# Patient Record
Sex: FEMALE | Race: WHITE | Employment: FULL TIME | URBAN - METROPOLITAN AREA
[De-identification: names, ages, dates, MRNs, and addresses within clinical notes are randomized per-mention and may not be internally consistent; named-entity substitution may affect disease eponyms.]

---

## 2017-06-24 ENCOUNTER — HOSPITAL ENCOUNTER (EMERGENCY)
Facility: HOSPITAL | Age: 37
Discharge: HOME | End: 2017-06-24
Attending: EMERGENCY MEDICINE
Payer: COMMERCIAL

## 2017-06-24 ENCOUNTER — OFFICE VISIT (OUTPATIENT)
Dept: FAMILY MEDICINE CLINIC | Facility: CLINIC | Age: 37
End: 2017-06-24

## 2017-06-24 ENCOUNTER — APPOINTMENT (OUTPATIENT)
Dept: CT IMAGING | Facility: HOSPITAL | Age: 37
End: 2017-06-24
Attending: EMERGENCY MEDICINE
Payer: COMMERCIAL

## 2017-06-24 VITALS
RESPIRATION RATE: 14 BRPM | BODY MASS INDEX: 24.11 KG/M2 | SYSTOLIC BLOOD PRESSURE: 135 MMHG | HEIGHT: 66 IN | OXYGEN SATURATION: 96 % | WEIGHT: 150 LBS | DIASTOLIC BLOOD PRESSURE: 82 MMHG | HEART RATE: 85 BPM | TEMPERATURE: 98 F

## 2017-06-24 VITALS
DIASTOLIC BLOOD PRESSURE: 84 MMHG | HEART RATE: 110 BPM | RESPIRATION RATE: 16 BRPM | OXYGEN SATURATION: 99 % | SYSTOLIC BLOOD PRESSURE: 124 MMHG | TEMPERATURE: 98 F

## 2017-06-24 DIAGNOSIS — J36 PERITONSILLAR ABSCESS: Primary | ICD-10-CM

## 2017-06-24 DIAGNOSIS — Z02.9 ENCOUNTERS FOR ADMINISTRATIVE PURPOSE: Primary | ICD-10-CM

## 2017-06-24 PROCEDURE — 86403 PARTICLE AGGLUT ANTBDY SCRN: CPT | Performed by: EMERGENCY MEDICINE

## 2017-06-24 PROCEDURE — 70490 CT SOFT TISSUE NECK W/O DYE: CPT | Performed by: EMERGENCY MEDICINE

## 2017-06-24 PROCEDURE — 96375 TX/PRO/DX INJ NEW DRUG ADDON: CPT

## 2017-06-24 PROCEDURE — 99284 EMERGENCY DEPT VISIT MOD MDM: CPT

## 2017-06-24 PROCEDURE — 96361 HYDRATE IV INFUSION ADD-ON: CPT

## 2017-06-24 PROCEDURE — 81025 URINE PREGNANCY TEST: CPT

## 2017-06-24 PROCEDURE — 96374 THER/PROPH/DIAG INJ IV PUSH: CPT

## 2017-06-24 PROCEDURE — 42700 I&D ABSCESS PERITONSILLAR: CPT

## 2017-06-24 PROCEDURE — 70492 CT SFT TSUE NCK W/O & W/DYE: CPT | Performed by: EMERGENCY MEDICINE

## 2017-06-24 PROCEDURE — 99285 EMERGENCY DEPT VISIT HI MDM: CPT

## 2017-06-24 PROCEDURE — 80048 BASIC METABOLIC PNL TOTAL CA: CPT | Performed by: EMERGENCY MEDICINE

## 2017-06-24 RX ORDER — HYDROCODONE BITARTRATE AND ACETAMINOPHEN 5; 325 MG/1; MG/1
1-2 TABLET ORAL EVERY 4 HOURS PRN
Qty: 20 TABLET | Refills: 0 | Status: SHIPPED | OUTPATIENT
Start: 2017-06-24 | End: 2017-07-01

## 2017-06-24 RX ORDER — HYDROMORPHONE HYDROCHLORIDE 1 MG/ML
1 INJECTION, SOLUTION INTRAMUSCULAR; INTRAVENOUS; SUBCUTANEOUS ONCE
Status: COMPLETED | OUTPATIENT
Start: 2017-06-24 | End: 2017-06-24

## 2017-06-24 RX ORDER — CEFUROXIME AXETIL 500 MG/1
500 TABLET ORAL 2 TIMES DAILY
Qty: 20 TABLET | Refills: 0 | Status: SHIPPED | OUTPATIENT
Start: 2017-06-24 | End: 2018-09-27

## 2017-06-24 RX ORDER — SODIUM CHLORIDE 9 MG/ML
1000 INJECTION, SOLUTION INTRAVENOUS ONCE
Status: COMPLETED | OUTPATIENT
Start: 2017-06-24 | End: 2017-06-24

## 2017-06-24 RX ORDER — KETOROLAC TROMETHAMINE 30 MG/ML
30 INJECTION, SOLUTION INTRAMUSCULAR; INTRAVENOUS ONCE
Status: COMPLETED | OUTPATIENT
Start: 2017-06-24 | End: 2017-06-24

## 2017-06-24 NOTE — ED NOTES
Assisted Dr. Dayna Gunn @ bs for I&D of peritonsillar abscess. Suction to bs. With emesis basin.  Emotional support offered

## 2017-06-24 NOTE — ED NOTES
Pt sitting up on stretcher smiling. Pt stating her throat feels much better with less pain to left side of neck making it easier to open  Her mouth. Ice chips given.

## 2017-06-24 NOTE — ED PROVIDER NOTES
Patient Seen in: [facility] Emergency Department    History   Patient presents with:  Sore Throat    Stated Complaint: rule out tonsillary abscess    HPI    37-year-old woman who comes in with 8 days of sore throat.   She was treated with amoxicillin 98 °F (36.7 °C) (Temporal)   Resp 14   Ht 167.6 cm (5' 6\")   Wt 68 kg   SpO2 96%   BMI 24.21 kg/m²         Physical Exam    General:  Vitals as listed. No acute distress   HEENT: Sclerae anicteric. Conjunctivae show no pallor. Mild trismus.   Oropharynx

## 2017-06-24 NOTE — ED INITIAL ASSESSMENT (HPI)
Patient presents from urgent care for evaluation of left sided sore throat. She tested positive for strep about a week ago and completed amoxicillin without relief, she also started azithromycin outpatient without improvement.

## 2017-06-24 NOTE — CONSULTS
Name: Lillie An  Consulting Physician Kayley Griffiths of note sent to]: Dr. Hansel Luevano  Reason for Request: PTA    Onset Date: 6/24/17    Medications: (reviewed EMR)   Allergies: (reviewed EMR) No Known Allergies    PMH/PSH: (reviwed EMR)  FH/SH: (reviwed EMR)   R Sinuses nontender to palp. Facial strength symmetric/full. SCM symmetric, FROM neck. Ear R: Auricle no lesions; EAC clear/normal; TM nl, nl mobility. Ear L: Auricle no lesions; EAC clear/normal; TM nl, nl mobility.            Nose:  External n

## 2017-06-24 NOTE — PROGRESS NOTES
31 YOWF presents for acute left sided sore throat, leftear pain, left neck lymphnode swelling for about 1 weeks duration. Diagnosed with strep throat while out of town on vacation and started on amoxil without response.   She was changed to zpack which she

## 2018-09-27 PROCEDURE — 87624 HPV HI-RISK TYP POOLED RSLT: CPT | Performed by: OBSTETRICS & GYNECOLOGY

## 2018-09-27 PROCEDURE — 88175 CYTOPATH C/V AUTO FLUID REDO: CPT | Performed by: OBSTETRICS & GYNECOLOGY

## 2020-07-09 DIAGNOSIS — Z78.9 PARTICIPANT IN HEALTH AND WELLNESS PLAN: Primary | ICD-10-CM

## 2020-08-16 ENCOUNTER — NURSE ONLY (OUTPATIENT)
Dept: LAB | Age: 40
End: 2020-08-16
Attending: PREVENTIVE MEDICINE

## 2020-08-16 DIAGNOSIS — Z78.9 PARTICIPANT IN HEALTH AND WELLNESS PLAN: ICD-10-CM

## 2020-08-16 LAB — SARS-COV-2 IGG SERPLBLD QL IA.RAPID: NEGATIVE

## 2020-08-16 PROCEDURE — 86769 SARS-COV-2 COVID-19 ANTIBODY: CPT

## 2020-12-18 ENCOUNTER — IMMUNIZATION (OUTPATIENT)
Dept: LAB | Facility: HOSPITAL | Age: 40
End: 2020-12-18
Attending: PREVENTIVE MEDICINE
Payer: COMMERCIAL

## 2020-12-18 DIAGNOSIS — Z23 NEED FOR VACCINATION: ICD-10-CM

## 2020-12-18 PROCEDURE — 0001A PFIZER-BIONTECH COVID-19 VACCINE: CPT

## 2020-12-28 ENCOUNTER — NURSE ONLY (OUTPATIENT)
Dept: LAB | Facility: HOSPITAL | Age: 40
End: 2020-12-28
Attending: PREVENTIVE MEDICINE

## 2020-12-28 ENCOUNTER — TELEPHONE (OUTPATIENT)
Dept: INTERNAL MEDICINE CLINIC | Facility: HOSPITAL | Age: 40
End: 2020-12-28

## 2020-12-28 DIAGNOSIS — Z20.822 SUSPECTED 2019 NOVEL CORONAVIRUS INFECTION: Primary | ICD-10-CM

## 2020-12-28 DIAGNOSIS — Z20.822 SUSPECTED 2019 NOVEL CORONAVIRUS INFECTION: ICD-10-CM

## 2020-12-28 PROCEDURE — 87426 SARSCOV CORONAVIRUS AG IA: CPT

## 2020-12-28 NOTE — TELEPHONE ENCOUNTER
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 Shares office with educator  Do you have any family members sick at home? [] Yes    [x] No   If yes, explain:       NOTES: Raza Bose called with the above s/s that have worsened over night.            PLAN:   [x]No K

## 2020-12-28 NOTE — TELEPHONE ENCOUNTER
Results and RTW guidelines:    COVID RESULT GIVEN:      Test type:    [x] Rapid         []       [x] NEGATIVE     Ordered  retest?  []Yes   [x] No (skip to RTW)         Notes:  Reports mild sinus congestion improved with cold medicine    RTW PL

## 2021-01-08 ENCOUNTER — IMMUNIZATION (OUTPATIENT)
Dept: LAB | Facility: HOSPITAL | Age: 41
End: 2021-01-08
Attending: PREVENTIVE MEDICINE
Payer: COMMERCIAL

## 2021-01-08 DIAGNOSIS — Z23 NEED FOR VACCINATION: ICD-10-CM

## 2021-01-08 PROCEDURE — 0002A SARSCOV2 VAC 30MCG/0.3ML IM: CPT

## 2021-02-23 ENCOUNTER — OFFICE VISIT (OUTPATIENT)
Dept: SURGERY | Facility: CLINIC | Age: 41
End: 2021-02-23

## 2021-02-23 DIAGNOSIS — L98.8 RHYTIDES: Primary | ICD-10-CM

## 2021-02-23 NOTE — PROCEDURES
Patient presents requesting Botox treatments to her forehead and glabellar region. Her last Botox treatment was in November and without any complications. Procedure: The forehead and glabella region were cleansed with alcohol.   20 units of Botox (lot C6

## 2022-01-07 ENCOUNTER — TELEPHONE (OUTPATIENT)
Dept: INTERNAL MEDICINE CLINIC | Facility: HOSPITAL | Age: 42
End: 2022-01-07

## 2022-01-07 NOTE — TELEPHONE ENCOUNTER
Outside Covid Testing done    Results and RTW guidelines:    COVID RESULT reported:      Test type:    [] Rapid outside         [x] PCR outside    Date of test: 1/5/22     Test location: [facility]         [x] Result viewed in Epic with verbal conse or diarrhea   - Keep communication open with management about RTW and if symptoms worsen     Notes:     RTW PLAN:    []  If COVID positive results, off work minimum of 5 days from positive test or onset of symptoms (day 0)    [x]      On day 5, if asymptom [x]     Elidia Laboy []    J&J []          Date of symptom onset: 1/2/22    SYMPTOMS:  [] asymptomatic    • Fever > 100F               Yes []          • Cough                          Yes []      • Shortness of breath  Yes []      • Congestion                 Y

## 2022-05-09 ENCOUNTER — OFFICE VISIT (OUTPATIENT)
Dept: INTERNAL MEDICINE CLINIC | Facility: CLINIC | Age: 42
End: 2022-05-09
Payer: COMMERCIAL

## 2022-05-09 VITALS
RESPIRATION RATE: 18 BRPM | WEIGHT: 191.81 LBS | BODY MASS INDEX: 31.96 KG/M2 | SYSTOLIC BLOOD PRESSURE: 128 MMHG | DIASTOLIC BLOOD PRESSURE: 78 MMHG | HEART RATE: 90 BPM | HEIGHT: 65 IN

## 2022-05-09 DIAGNOSIS — Z51.81 THERAPEUTIC DRUG MONITORING: Primary | ICD-10-CM

## 2022-05-09 DIAGNOSIS — E66.9 OBESITY WITH BODY MASS INDEX (BMI) OF 30.0 TO 39.9: ICD-10-CM

## 2022-05-09 DIAGNOSIS — R63.2 BINGE EATING: ICD-10-CM

## 2022-05-09 PROCEDURE — 3074F SYST BP LT 130 MM HG: CPT | Performed by: NURSE PRACTITIONER

## 2022-05-09 PROCEDURE — 3078F DIAST BP <80 MM HG: CPT | Performed by: NURSE PRACTITIONER

## 2022-05-09 PROCEDURE — 99204 OFFICE O/P NEW MOD 45 MIN: CPT | Performed by: NURSE PRACTITIONER

## 2022-05-09 PROCEDURE — 3008F BODY MASS INDEX DOCD: CPT | Performed by: NURSE PRACTITIONER

## 2022-05-09 NOTE — PATIENT INSTRUCTIONS
We are here to support you with weight loss, but please remember that you still need your primary care provider for your routine health maintenance. PLAN:  Will start vyvanse 20mg daily   Follow up with me in 4 weeks  Schedule follow up appointments: Aaliyah Nicole (dietitian) or Doren Riedel (presurgery dietitian)   Check for insurance coverage for dietitian and labwork prior to scheduling appointment. Please try to work on the following dietary changes:  1. Goals: Aim for 20-30 grams of protein/ meal  i. Aim for 100 grams of carbohydrates/day  ii. Eat 4-6 vegetables/day  iii. Avoid skipping meals- eat every 4-5 hours  iv. Aim for 3 meals/day  2. Drink lots of water and cut down on soda/juice consumption if soda/juice drinker  3. Focus on protein: (15-30 grams with each meal) ie. greek yogurt, cottage cheese, string cheese, hard boiled eggs  4. Healthy snacks: peanut butter and apples, hummus and carrots, berries, nuts (1/4 cup), tuna and crackers                 Protein Shakes: Premier protein or Core Power                Protein Bars: Rx Bars, Oatmega, Power Crunch                 Sargento balanced breaks (cheese and nuts)- without chocolate  5. Reduce carbohydrates which includes sweets as well as rice, pasta, potatoes, bread, corn and instead choose whole grain options or more protein or vegetables (4-6 servings of vegetables per day)  6. Get a good night of sleep  7. Try to decrease stress in life     Please download apps:  1. \"My Fitness Pal\" (other option is Lose it)) to help you to monitor daily dietary intake and you will be able to see if you are eating the right amount of calories, protein, carbs                With My Fitness Pal-->When you set-up the marianne or need to adjust settings:                Goals should include:                  Lose 1.5-2 lbs per week                Activity level: not very active (can't count exercise towards calorie number per day)                   ** Daily INPUT> Look at nutrition section-- \"nutrients\" and it will break down your macros for the day (ie. Protein, carbs, fibers, sugars and fats). Try to stay within these numbers daily     2. \"7 minute workout\" to help with exercise/activity which takes 7 minutes of your day and that you can do at home! 3. \"Calm\" or \"Headspace\" which helps with mindfulness, meditation, clarity, sleep, and dameon to your daily life. 4. Apalya blog for healthy recipe ideas  5. Clinician [employer]rapeutics for low carb resources    HIGH PROTEIN SNACK IDEAS  -cottage cheese  -plain yogurt  -kefir  -hard-boiled eggs  -natural cheeses  -nuts (measure portion size)   -unsweetened nut butters  -dried edamame   -tracey seeds soaked in water or almond milk  -soy nuts  -cured meats (monitor for sodium issues)   -hummus with vegetables  -bean dip with vegetables     FRUIT  Low carb fruit options   Raspberries: Half a cup (60 grams) contains 3 grams of carbs. Blackberries: Half a cup (70 grams) contains 4 grams of carbs. Strawberries: Half a cup (100 grams) contains 6 grams of carbs. Blueberries: Half a cup (50 grams) contains 6 grams of carbs. Plum: One medium-sized (80 grams) contains 6 grams of carbs.      VEGETABLES  Low carb vegetables

## 2022-05-12 NOTE — PROGRESS NOTES
Mildly low Vitamin B12: please start daily over the counter B complex vitamin   Cholesterol: (elevated total, high triglycerides and LDL) recommend exercise, weight loss, increasing fiber, fish and nuts.  You can try adding an over the counter fish oil daily too (to see if this helps)

## 2022-05-13 LAB
ABSOLUTE BASOPHILS: 71 CELLS/UL (ref 0–200)
ABSOLUTE EOSINOPHILS: 122 CELLS/UL (ref 15–500)
ABSOLUTE LYMPHOCYTES: 3121 CELLS/UL (ref 850–3900)
ABSOLUTE MONOCYTES: 612 CELLS/UL (ref 200–950)
ABSOLUTE NEUTROPHILS: 6273 CELLS/UL (ref 1500–7800)
ALBUMIN/GLOBULIN RATIO: 1.4 (CALC) (ref 1–2.5)
ALBUMIN: 4.3 G/DL (ref 3.6–5.1)
ALKALINE PHOSPHATASE: 73 U/L (ref 31–125)
ALT: 20 U/L (ref 6–29)
AST: 18 U/L (ref 10–30)
BASOPHILS: 0.7 %
BILIRUBIN, TOTAL: 0.4 MG/DL (ref 0.2–1.2)
BUN: 7 MG/DL (ref 7–25)
CALCIUM: 9.2 MG/DL (ref 8.6–10.2)
CARBON DIOXIDE: 24 MMOL/L (ref 20–32)
CHLORIDE: 102 MMOL/L (ref 98–110)
CHOL/HDLC RATIO: 4.2 (CALC)
CHOLESTEROL, TOTAL: 223 MG/DL
CREATININE: 0.66 MG/DL (ref 0.5–1.1)
EGFR IF AFRICN AM: 129 ML/MIN/1.73M2
EGFR IF NONAFRICN AM: 111 ML/MIN/1.73M2
EOSINOPHILS: 1.2 %
GLOBULIN: 3.1 G/DL (CALC) (ref 1.9–3.7)
GLUCOSE: 87 MG/DL (ref 65–99)
HDL CHOLESTEROL: 53 MG/DL
HEMATOCRIT: 37.8 % (ref 35–45)
HEMOGLOBIN A1C: 5.6 % OF TOTAL HGB
HEMOGLOBIN: 12.7 G/DL (ref 11.7–15.5)
LDL-CHOLESTEROL: 140 MG/DL (CALC)
LYMPHOCYTES: 30.6 %
MCH: 30.6 PG (ref 27–33)
MCHC: 33.6 G/DL (ref 32–36)
MCV: 91.1 FL (ref 80–100)
MONOCYTES: 6 %
MPV: 11.4 FL (ref 7.5–12.5)
NEUTROPHILS: 61.5 %
NON-HDL CHOLESTEROL: 170 MG/DL (CALC)
PLATELET COUNT: 306 THOUSAND/UL (ref 140–400)
POTASSIUM: 4.1 MMOL/L (ref 3.5–5.3)
PROTEIN, TOTAL: 7.4 G/DL (ref 6.1–8.1)
RDW: 11.8 % (ref 11–15)
RED BLOOD CELL COUNT: 4.15 MILLION/UL (ref 3.8–5.1)
SODIUM: 137 MMOL/L (ref 135–146)
T4, FREE: 1.3 NG/DL (ref 0.8–1.8)
TRIGLYCERIDES: 166 MG/DL
TSH: 1.22 MIU/L
VITAMIN B12: 306 PG/ML (ref 200–1100)
VITAMIN D, 25-OH, TOTAL: 15 NG/ML (ref 30–100)
WHITE BLOOD CELL COUNT: 10.2 THOUSAND/UL (ref 3.8–10.8)

## 2022-05-16 NOTE — PROGRESS NOTES
Here are the added test results  A1c 5.6% is normal (but very close to prediabetes- which is 5.7-6.4%)  Low Vitamin d:  please start taking ergocalciferol 50,000 U q week 16 weeks (please order #16 no refill).  Then start daily maintenance vitamin D 2000 Units  Thyroid is normal

## 2022-06-14 ENCOUNTER — OFFICE VISIT (OUTPATIENT)
Dept: INTERNAL MEDICINE CLINIC | Facility: CLINIC | Age: 42
End: 2022-06-14
Payer: COMMERCIAL

## 2022-06-14 VITALS
HEART RATE: 97 BPM | RESPIRATION RATE: 16 BRPM | HEIGHT: 65 IN | SYSTOLIC BLOOD PRESSURE: 130 MMHG | DIASTOLIC BLOOD PRESSURE: 84 MMHG | BODY MASS INDEX: 30.16 KG/M2 | OXYGEN SATURATION: 99 % | WEIGHT: 181 LBS

## 2022-06-14 DIAGNOSIS — R63.2 BINGE EATING: ICD-10-CM

## 2022-06-14 DIAGNOSIS — Z51.81 THERAPEUTIC DRUG MONITORING: Primary | ICD-10-CM

## 2022-06-14 DIAGNOSIS — E55.9 VITAMIN D DEFICIENCY: ICD-10-CM

## 2022-06-14 DIAGNOSIS — E66.9 OBESITY WITH BODY MASS INDEX (BMI) OF 30.0 TO 39.9: ICD-10-CM

## 2022-06-14 PROCEDURE — 3008F BODY MASS INDEX DOCD: CPT | Performed by: NURSE PRACTITIONER

## 2022-06-14 PROCEDURE — 3079F DIAST BP 80-89 MM HG: CPT | Performed by: NURSE PRACTITIONER

## 2022-06-14 PROCEDURE — 99213 OFFICE O/P EST LOW 20 MIN: CPT | Performed by: NURSE PRACTITIONER

## 2022-06-14 PROCEDURE — 3075F SYST BP GE 130 - 139MM HG: CPT | Performed by: NURSE PRACTITIONER

## 2022-06-14 NOTE — PATIENT INSTRUCTIONS
We are here to support you with weight loss, but please remember that you still need your primary care provider for your routine health maintenance. PLAN:  Will increase vyvanse 30 mg daily   Follow up with me in 4 weeks  Schedule follow up appointments: Anatoliy Rowe (dietitian) or Cinda Smalls (presurgery dietitian)   Check for insurance coverage for dietitian and labwork prior to scheduling appointment. Please try to work on the following dietary changes:  1. Goals: Aim for 20-30 grams of protein/ meal  i. Aim for 100 grams of carbohydrates/day  ii. Eat 4-6 vegetables/day  iii. Avoid skipping meals- eat every 4-5 hours  iv. Aim for 3 meals/day  2. Drink lots of water and cut down on soda/juice consumption if soda/juice drinker  3. Focus on protein: (15-30 grams with each meal) ie. greek yogurt, cottage cheese, string cheese, hard boiled eggs  4. Healthy snacks: peanut butter and apples, hummus and carrots, berries, nuts (1/4 cup), tuna and crackers                 Protein Shakes: Premier protein or Core Power                Protein Bars: Rx Bars, Oatmega, Power Crunch                 Sargento balanced breaks (cheese and nuts)- without chocolate  5. Reduce carbohydrates which includes sweets as well as rice, pasta, potatoes, bread, corn and instead choose whole grain options or more protein or vegetables (4-6 servings of vegetables per day)  6. Get a good night of sleep  7. Try to decrease stress in life     Please download apps:  1. \"My Fitness Pal\" (other option is Lose it)) to help you to monitor daily dietary intake and you will be able to see if you are eating the right amount of calories, protein, carbs                With My Fitness Pal-->When you set-up the marianne or need to adjust settings:                Goals should include:                  Lose 1.5-2 lbs per week                Activity level: not very active (can't count exercise towards calorie number per day)                   ** Daily INPUT> Look at nutrition section-- \"nutrients\" and it will break down your macros for the day (ie. Protein, carbs, fibers, sugars and fats). Try to stay within these numbers daily     2. \"7 minute workout\" to help with exercise/activity which takes 7 minutes of your day and that you can do at home! 3. \"Calm\" or \"Headspace\" which helps with mindfulness, meditation, clarity, sleep, and dameon to your daily life. 4. Identifiede blog for healthy recipe ideas  5. [employer] for low carb resources    HIGH PROTEIN SNACK IDEAS  -cottage cheese  -plain yogurt  -kefir  -hard-boiled eggs  -natural cheeses  -nuts (measure portion size)   -unsweetened nut butters  -dried edamame   -tracey seeds soaked in water or almond milk  -soy nuts  -cured meats (monitor for sodium issues)   -hummus with vegetables  -bean dip with vegetables     FRUIT  Low carb fruit options   Raspberries: Half a cup (60 grams) contains 3 grams of carbs. Blackberries: Half a cup (70 grams) contains 4 grams of carbs. Strawberries: Half a cup (100 grams) contains 6 grams of carbs. Blueberries: Half a cup (50 grams) contains 6 grams of carbs. Plum: One medium-sized (80 grams) contains 6 grams of carbs.      VEGETABLES  Low carb vegetables

## 2022-08-08 ENCOUNTER — OFFICE VISIT (OUTPATIENT)
Dept: INTERNAL MEDICINE CLINIC | Facility: CLINIC | Age: 42
End: 2022-08-08
Payer: COMMERCIAL

## 2022-08-08 VITALS
WEIGHT: 174 LBS | DIASTOLIC BLOOD PRESSURE: 82 MMHG | SYSTOLIC BLOOD PRESSURE: 140 MMHG | RESPIRATION RATE: 16 BRPM | OXYGEN SATURATION: 100 % | BODY MASS INDEX: 28.99 KG/M2 | HEART RATE: 83 BPM | HEIGHT: 65 IN

## 2022-08-08 DIAGNOSIS — Z51.81 THERAPEUTIC DRUG MONITORING: Primary | ICD-10-CM

## 2022-08-08 DIAGNOSIS — R63.2 BINGE EATING: ICD-10-CM

## 2022-08-08 DIAGNOSIS — E66.3 OVERWEIGHT (BMI 25.0-29.9): ICD-10-CM

## 2022-08-08 DIAGNOSIS — E55.9 VITAMIN D DEFICIENCY: ICD-10-CM

## 2022-08-08 PROCEDURE — 3079F DIAST BP 80-89 MM HG: CPT | Performed by: NURSE PRACTITIONER

## 2022-08-08 PROCEDURE — 3008F BODY MASS INDEX DOCD: CPT | Performed by: NURSE PRACTITIONER

## 2022-08-08 PROCEDURE — 3077F SYST BP >= 140 MM HG: CPT | Performed by: NURSE PRACTITIONER

## 2022-08-08 PROCEDURE — 99213 OFFICE O/P EST LOW 20 MIN: CPT | Performed by: NURSE PRACTITIONER

## 2022-08-08 NOTE — PATIENT INSTRUCTIONS
We are here to support you with weight loss, but please remember that you still need your primary care provider for your routine health maintenance. PLAN:  Will increase vyvanse 40 mg daily   Follow up with me in 8-12 weeks  Schedule follow up appointments: Aaliyah Nicole (dietitian) or Doren Riedel (presurgery dietitian)   Check for insurance coverage for dietitian and labwork prior to scheduling appointment. Please try to work on the following dietary changes:  1. Goals: Aim for 20-30 grams of protein/ meal  i. Aim for 100 grams of carbohydrates/day  ii. Eat 4-6 vegetables/day  iii. Avoid skipping meals- eat every 4-5 hours  iv. Aim for 3 meals/day  2. Drink lots of water and cut down on soda/juice consumption if soda/juice drinker  3. Focus on protein: (15-30 grams with each meal) ie. greek yogurt, cottage cheese, string cheese, hard boiled eggs  4. Healthy snacks: peanut butter and apples, hummus and carrots, berries, nuts (1/4 cup), tuna and crackers                 Protein Shakes: Premier protein or Core Power                Protein Bars: Rx Bars, Oatmega, Power Crunch                 Sargento balanced breaks (cheese and nuts)- without chocolate  5. Reduce carbohydrates which includes sweets as well as rice, pasta, potatoes, bread, corn and instead choose whole grain options or more protein or vegetables (4-6 servings of vegetables per day)  6. Get a good night of sleep  7. Try to decrease stress in life     Please download apps:  1. \"My Fitness Pal\" (other option is Lose it)) to help you to monitor daily dietary intake and you will be able to see if you are eating the right amount of calories, protein, carbs                With My Fitness Pal-->When you set-up the marianne or need to adjust settings:                Goals should include:                  Lose 1.5-2 lbs per week                Activity level: not very active (can't count exercise towards calorie number per day)                   ** Daily INPUT> Look at nutrition section-- \"nutrients\" and it will break down your macros for the day (ie. Protein, carbs, fibers, sugars and fats). Try to stay within these numbers daily     2. \"7 minute workout\" to help with exercise/activity which takes 7 minutes of your day and that you can do at home! 3. \"Calm\" or \"Headspace\" which helps with mindfulness, meditation, clarity, sleep, and dameon to your daily life. 4. GigaSpacese blog for healthy recipe ideas  5. [employer] for low carb resources    HIGH PROTEIN SNACK IDEAS  -cottage cheese  -plain yogurt  -kefir  -hard-boiled eggs  -natural cheeses  -nuts (measure portion size)   -unsweetened nut butters  -dried edamame   -tracey seeds soaked in water or almond milk  -soy nuts  -cured meats (monitor for sodium issues)   -hummus with vegetables  -bean dip with vegetables     FRUIT  Low carb fruit options   Raspberries: Half a cup (60 grams) contains 3 grams of carbs. Blackberries: Half a cup (70 grams) contains 4 grams of carbs. Strawberries: Half a cup (100 grams) contains 6 grams of carbs. Blueberries: Half a cup (50 grams) contains 6 grams of carbs. Plum: One medium-sized (80 grams) contains 6 grams of carbs.      VEGETABLES  Low carb vegetables

## 2022-10-24 ENCOUNTER — IMMUNIZATION (OUTPATIENT)
Dept: LAB | Facility: HOSPITAL | Age: 42
End: 2022-10-24
Attending: PREVENTIVE MEDICINE
Payer: COMMERCIAL

## 2022-10-24 DIAGNOSIS — Z23 NEED FOR VACCINATION: Primary | ICD-10-CM

## 2022-10-24 PROCEDURE — 0124A SARSCOV2 VAC BVL 30MCG/0.3ML: CPT

## 2022-10-24 PROCEDURE — 90471 IMMUNIZATION ADMIN: CPT

## 2022-10-25 ENCOUNTER — OFFICE VISIT (OUTPATIENT)
Dept: INTERNAL MEDICINE CLINIC | Facility: CLINIC | Age: 42
End: 2022-10-25
Payer: COMMERCIAL

## 2022-10-25 VITALS
BODY MASS INDEX: 27.99 KG/M2 | HEIGHT: 65 IN | DIASTOLIC BLOOD PRESSURE: 80 MMHG | HEART RATE: 80 BPM | SYSTOLIC BLOOD PRESSURE: 140 MMHG | WEIGHT: 168 LBS | RESPIRATION RATE: 14 BRPM

## 2022-10-25 DIAGNOSIS — R63.2 BINGE EATING: ICD-10-CM

## 2022-10-25 DIAGNOSIS — E55.9 VITAMIN D DEFICIENCY: ICD-10-CM

## 2022-10-25 DIAGNOSIS — E66.3 OVERWEIGHT (BMI 25.0-29.9): ICD-10-CM

## 2022-10-25 DIAGNOSIS — Z51.81 THERAPEUTIC DRUG MONITORING: Primary | ICD-10-CM

## 2022-10-25 PROCEDURE — 3079F DIAST BP 80-89 MM HG: CPT | Performed by: NURSE PRACTITIONER

## 2022-10-25 PROCEDURE — 3008F BODY MASS INDEX DOCD: CPT | Performed by: NURSE PRACTITIONER

## 2022-10-25 PROCEDURE — 3077F SYST BP >= 140 MM HG: CPT | Performed by: NURSE PRACTITIONER

## 2022-10-25 PROCEDURE — 99213 OFFICE O/P EST LOW 20 MIN: CPT | Performed by: NURSE PRACTITIONER

## 2022-11-30 DIAGNOSIS — Z51.81 THERAPEUTIC DRUG MONITORING: ICD-10-CM

## 2022-11-30 DIAGNOSIS — R63.2 BINGE EATING: ICD-10-CM

## 2022-11-30 DIAGNOSIS — E66.3 OVERWEIGHT (BMI 25.0-29.9): ICD-10-CM

## 2022-11-30 RX ORDER — LISDEXAMFETAMINE DIMESYLATE 40 MG/1
CAPSULE ORAL
Qty: 30 CAPSULE | Refills: 0 | Status: SHIPPED | OUTPATIENT
Start: 2022-11-30

## 2022-11-30 NOTE — TELEPHONE ENCOUNTER
Requesting vyvanse 40 mg  LOV: 10/25/22  RTC:  One month  Last Relevant Labs: na  Filled: 10/9/22 #30 with 0 refills [place]    Future Appointments   Date Time Provider Ramon Mitchell   1/24/2023  3:00 PM MIKE Schaefer EMGWEI [facility]

## 2022-11-30 NOTE — TELEPHONE ENCOUNTER
harsh called as patient came there to get her vyvanse and all her prescriptions have . Need new RX sent if you wish to provide.     Requesting vyvanse 40 mg  LOV: 10/25/22  RTC: 3 monhts  Last Relevant Labs: na  Filled: 10/9/22 #30 with 0 refills  Last [place]26[place]22 [place]    Future Appointments   Date Time Provider Ramon Mitchell   2023  3:00 PM MIKE Gonzalez [facility]

## 2023-01-16 DIAGNOSIS — R63.2 BINGE EATING: ICD-10-CM

## 2023-01-16 DIAGNOSIS — E66.3 OVERWEIGHT (BMI 25.0-29.9): ICD-10-CM

## 2023-01-16 DIAGNOSIS — Z51.81 THERAPEUTIC DRUG MONITORING: ICD-10-CM

## 2023-01-16 RX ORDER — LISDEXAMFETAMINE DIMESYLATE 40 MG/1
CAPSULE ORAL
Qty: 30 CAPSULE | Refills: 0 | Status: SHIPPED | OUTPATIENT
Start: 2023-01-16

## 2023-01-24 ENCOUNTER — OFFICE VISIT (OUTPATIENT)
Dept: INTERNAL MEDICINE CLINIC | Facility: CLINIC | Age: 43
End: 2023-01-24
Payer: COMMERCIAL

## 2023-01-24 VITALS
DIASTOLIC BLOOD PRESSURE: 70 MMHG | HEIGHT: 65 IN | HEART RATE: 94 BPM | WEIGHT: 165 LBS | RESPIRATION RATE: 16 BRPM | BODY MASS INDEX: 27.49 KG/M2 | SYSTOLIC BLOOD PRESSURE: 120 MMHG | OXYGEN SATURATION: 98 %

## 2023-01-24 DIAGNOSIS — Z51.81 THERAPEUTIC DRUG MONITORING: Primary | ICD-10-CM

## 2023-01-24 DIAGNOSIS — E66.3 OVERWEIGHT (BMI 25.0-29.9): ICD-10-CM

## 2023-01-24 DIAGNOSIS — R63.2 BINGE EATING: ICD-10-CM

## 2023-01-24 DIAGNOSIS — E55.9 VITAMIN D DEFICIENCY: ICD-10-CM

## 2023-01-24 PROCEDURE — 99213 OFFICE O/P EST LOW 20 MIN: CPT | Performed by: NURSE PRACTITIONER

## 2023-01-24 PROCEDURE — 3078F DIAST BP <80 MM HG: CPT | Performed by: NURSE PRACTITIONER

## 2023-01-24 PROCEDURE — 3074F SYST BP LT 130 MM HG: CPT | Performed by: NURSE PRACTITIONER

## 2023-01-24 PROCEDURE — 3008F BODY MASS INDEX DOCD: CPT | Performed by: NURSE PRACTITIONER

## 2023-01-24 NOTE — PATIENT INSTRUCTIONS
Next steps:  1. Fill your prescribed medication and take as discussed and prescribed: vyvanse 40mg daily   2. Schedule a personal nutrition consultation with one of our registered dieticians     Please try to work on the following dietary changes:    1. Drink water with meals and throughout the day, cut down on soda and/or juice if consumed. Consider flavored water options like Bubbly, Spindrift, Hint and Aaron. 2.  Eat breakfast daily and focus on having protein with each meal, examples include: greek yogurt, cottage cheese, hard boiled egg, whole grain toast with peanut butter. 3.  Reduce refined carbohydrates and sugars which includes items such as sweets, as well as rice, pasta, and bread and make sure to choose whole grain options when having them with just 1 serving per meal about the size of your inner palm. 4.  Consume non starchy veggies daily working towards making them a good 50% of your daily food intake. Add them to lunch and dinner consistently. 5.  Start a daily probiotic: VSL#3 is recommended, (order on line at www.vsl3. com). Take 1 capsule daily with water for 30 days, then reduce to 1 every other day (this will reduce the cost). Capsules can be left out for 2 weeks, but then must be refrigerated. Please download marianne My Fitness MarShare [facility] Prim! Or Net Diary to monitor daily dietary intake and you will be able to see if you are eating the right amount of calories or too much or too little which would hinder weight loss. Additionally this will help to see your daily carbohydrate and protein intake. When you set the marianne up choose 1-2 lbs/week as a goal.  Keeping a paper food journal is an option as well to remain accountable for your choices- this is the start to mindful eating! A low calorie diet has been consistently shown to support weight loss. Continue or start exercising to help establish a routine.  If not already exercising begin with 1 day and progress as able with long-term goal of 30 minutes 5 days a week at a minimum. Meditation daily can help manage and control stress. Chronic stress can make weight loss difficult. Exercising is one way to help with stress, but meditation using the CALM Alley or another comparable alternative can be done in your home or place of work with little time commitment. This Alley can also help work on behavior change and improve sleep. Check out the segment under Calm Masterclass and listen to The 4 Pillars of Health. A great way to begin learning about the foundation of lifestyle with practical tips to use in your every day. Check out www.yourweightmatters. org blog for continued daily support and education along this weight loss journey! Patient Resources:     Personal Training/Fitness Classes/Health Coaching     [facility] [place] @ [email or web address]/ Full fitness center with group fitness and personal training. Discount available as client of [facility]. Health Coaching and Personal Training with Chip Bejarano at our [facility]- individual weekly coaching with option to add personal training and small group fitness classes targeted at weight loss- [phone] and/or email @ [email or web address]  [email or web address] Group Fitness [phone] and/or email Robert Borja at [email or web address]  [facility] with multiple locations: Aetna (www.Signal Sciences), Eat The Vidder Fitness (www.Control Medical Technology. Fluoresentric), Fit Body Bootcamp (www.Woldmep.Fluoresentric), Yodh Power and Technologies Group Limited Fitness (www.Linguee), The Exercise  (www.exercisecoach.Fluoresentric)     Online Fitness  Fitness  on [employer] in 10 DVD series- www. cvnhk50YSR. Fluoresentric  Sit and Be Fit - Chair exercise series Www.sitandbefit. org  Hip Hop Fit with Camilo Sandhu at www.hiphopfit. net     Apps for on the Helpr 7 Minute Workout (orange box with white 7) - free on the go HIIT training alley  Peloton Alley @ www. Kozio     Nutrition Trackers and Tools  LoseIT! And My Fitness Pal apps and on line for tracking nutrition  NOOM - virtual health coaching  FitFoundation (healthy meals on the go) in [facility] @ www. dkozhzexlmhux9o. Lucius Tang MD @ www.Thrupoint and Tanner Luis (keto and low carb plans recommended) @ www. RFHSQC55.NSK, Metabolic Meals @ www. NanushkaMetabolicMeals. com - individual prepared meals to go  Moka5.com, Let it Wave, International Business Machines, Every Plate, 99inn.cc- on line meal delivery programs for preparation at home  AK Riskalyze in [place] for homemade meals to go @ wwwSim Ops Studios. Bioenvision  Diet Doctor @ www. dietdoctor. Bioenvision - low carb swaps  YuGenprex - meal prep and planning alley (www.yummly. com)     Stress Management/Behavior/Mindful Eating  CALM meditation alley (www.Liquid Engines)  Headspace  Am I Hungry? Mindful eating virtual  alley  Www.yourweightmatters. org - Obesity Action Coalition sponsored Blog posts daily  Motivation alley (black box with white \")- daily supportive messages sent to your phone     Books/Video Education/Podcasts  Mindless Eating by Vivian Chavez  Why We Get Sick by Melissa Paredes (a book about insulin resistance)  Atomic Habits by Viviana Mazariegos (a book about taking small steps to promote greater behavior change)   Can't Hurt Me by Tang Medrano (a book exploring the power of discipline in achieving your goals)  The End of Dieting: How to Live for Life by Dr. Ovi Terry M.D. or listen to The [facility] Episode 61: Understanding \"Nutritarian\" Eating w/Dr. Ovi Terry  Your Body in Balance: The World [employer] of Food, Hormones, and Health by Dr. Cheryl Suarez  The Menopause Diet Plan by Rosa Olvera and [facility]  The Complete Guide to fasting by Dr. Anne Gallegos, [facility] by Elis Martinez, Ph.D, R.D.   Weight Loss Surgery Will Not Treat Food Addiction by Kathy Garrison Ph.D  The [facility] on plant based nutrition  Fed Up - documentary about obesity (Free on Utube)  The Truth About Sugar - documentary on sugar (Free on Utube, https://youtu. be/5X8kuogUE8t)  The Dr. Moni Barrett by Dr. Allan Orta MD  Fitlosophy Fitspiration - journal to better health (found at [employer] in fitness aisle)  What Happened to You?- a look at the impact trauma has on behavior written by Yesy Rouse and Dr. Raelyn Kanner Again by Michelle Eid - discovering your true self after trauma  Caren Erickson talk on Allurion Technologies, The Call to HuTerra  Podcasts: The Exam Room by the Physician's Committee, Nutrition Facts by Dr. Nasra Hutchinson    We are here to support you with weight loss, but please remember that you still need your primary care provider for your routine health maintenance.

## 2023-02-20 DIAGNOSIS — R63.2 BINGE EATING: ICD-10-CM

## 2023-02-20 DIAGNOSIS — E66.3 OVERWEIGHT (BMI 25.0-29.9): ICD-10-CM

## 2023-02-20 DIAGNOSIS — Z51.81 THERAPEUTIC DRUG MONITORING: ICD-10-CM

## 2023-03-22 DIAGNOSIS — R63.2 BINGE EATING: ICD-10-CM

## 2023-03-22 DIAGNOSIS — E66.3 OVERWEIGHT (BMI 25.0-29.9): ICD-10-CM

## 2023-03-22 DIAGNOSIS — Z51.81 THERAPEUTIC DRUG MONITORING: ICD-10-CM

## 2023-03-23 RX ORDER — LISDEXAMFETAMINE DIMESYLATE 40 MG/1
CAPSULE ORAL
Qty: 30 CAPSULE | Refills: 0 | Status: SHIPPED | OUTPATIENT
Start: 2023-03-23

## 2023-03-23 NOTE — TELEPHONE ENCOUNTER
Requesting vyvanse 40 mg  LOV: 1/24/23  RTC: 3 months  Last Relevant Labs: na  Filled: 2/20/23 #30 with 0 refills last [place] 2[place]21[place]23 [place]    Future Appointments   Date Time Provider Ramon Mitchell   4/24/2023  4:40 PM MIKE Olea EMGALEJANDRO [facility] [address]

## 2023-04-24 ENCOUNTER — OFFICE VISIT (OUTPATIENT)
Dept: INTERNAL MEDICINE CLINIC | Facility: CLINIC | Age: 43
End: 2023-04-24
Payer: COMMERCIAL

## 2023-04-24 VITALS
SYSTOLIC BLOOD PRESSURE: 126 MMHG | WEIGHT: 149 LBS | OXYGEN SATURATION: 98 % | RESPIRATION RATE: 16 BRPM | BODY MASS INDEX: 24.83 KG/M2 | HEART RATE: 109 BPM | HEIGHT: 65 IN | DIASTOLIC BLOOD PRESSURE: 78 MMHG

## 2023-04-24 DIAGNOSIS — R63.2 BINGE EATING: ICD-10-CM

## 2023-04-24 DIAGNOSIS — Z51.81 THERAPEUTIC DRUG MONITORING: Primary | ICD-10-CM

## 2023-04-24 DIAGNOSIS — E55.9 VITAMIN D DEFICIENCY: ICD-10-CM

## 2023-04-24 DIAGNOSIS — E66.3 OVERWEIGHT (BMI 25.0-29.9): ICD-10-CM

## 2023-04-24 PROCEDURE — 3074F SYST BP LT 130 MM HG: CPT | Performed by: NURSE PRACTITIONER

## 2023-04-24 PROCEDURE — 3078F DIAST BP <80 MM HG: CPT | Performed by: NURSE PRACTITIONER

## 2023-04-24 PROCEDURE — 3008F BODY MASS INDEX DOCD: CPT | Performed by: NURSE PRACTITIONER

## 2023-04-24 PROCEDURE — 99213 OFFICE O/P EST LOW 20 MIN: CPT | Performed by: NURSE PRACTITIONER

## 2023-04-24 NOTE — PATIENT INSTRUCTIONS
Next steps:  1. Fill your prescribed medication and take as discussed and prescribed: vyvanse 40mg  2. Schedule a personal nutrition consultation with one of our registered dieticians       1. Drink water with meals and throughout the day, cut down on soda and/or juice if consumed. Consider flavored water options like Bubbly, Spindrift, Hint and Aaron. 2.  Eat breakfast daily and focus on having protein with each meal, examples include: greek yogurt, cottage cheese, hard boiled egg, whole grain toast with peanut butter. 3.  Reduce refined carbohydrates and sugars which includes items such as sweets, as well as rice, pasta, and bread and make sure to choose whole grain options when having them with just 1 serving per meal about the size of your inner palm. 4.  Consume non starchy veggies daily working towards making them a good 50% of your daily food intake. Add them to lunch and dinner consistently. 5.  Start a daily probiotic: VSL#3 is recommended, (order on line at www.vsl3. com). Take 1 capsule daily with water for 30 days, then reduce to 1 every other day (this will reduce the cost). Capsules can be left out for 2 weeks, but then must be refrigerated. Please download marianne My Fitness Maybelle Savannah! Or Net Diary to monitor daily dietary intake and you will be able to see if you are eating the right amount of calories or too much or too little which would hinder weight loss. Additionally this will help to see your daily carbohydrate and protein intake. When you set the marianne up choose 1-2 lbs/week as a goal.  Keeping a paper food journal is an option as well to remain accountable for your choices- this is the start to mindful eating! A low calorie diet has been consistently shown to support weight loss. Continue or start exercising to help establish a routine. If not already exercising begin with 1 day and progress as able with long-term goal of 30 minutes 5 days a week at a minimum.      Meditation daily can help manage and control stress. Chronic stress can make weight loss difficult. Exercising is one way to help with stress, but meditation using the CALM Alley or another comparable alternative can be done in your home or place of work with little time commitment. This Alley can also help work on behavior change and improve sleep. Check out the segment under Calm Masterclass and listen to The 4 Pillars of Health. A great way to begin learning about the foundation of lifestyle with practical tips to use in your every day. Check out www.yourweightmatters. org blog for continued daily support and education along this weight loss journey! Patient Resources:     Personal Training/Fitness Classes/Health Coaching     [facility] [place] and [place] @ [email or web address]/ Full fitness center with group fitness and personal training. Discount available as client of [facility]. Health Coaching and Personal Training with Petros Lorenzo at our [facility]- individual weekly coaching with option to add personal training and small group fitness classes targeted at weight loss- [phone] and/or email @ Karrie Shah. [email or web address]  360FIT [place] https://Imago Scientific Instruments-Net-Marketing Corporation.org/. Group Fitness [phone] and/or email Madison Hernandez at [email or web address]. MediWound  [address] [facility] with multiple locations: Aetna (www.Koubachi), SHOP.COM The Lucid Energy (www.Inventure Enterprises. MediWound), Fit Body Bootcamp (www.TeamPagesbodybootStublisherp.MediWound), ReVent Medical (www.Verold), The Exercise  (www.exercisecoach.MediWound)     Online Fitness  Fitness  on Whole Foods in 10 DVD series- www. mpebg24NUR. MediWound  Sit and Be Fit - Chair exercise series Www.sitandbefit. org  Hip Hop Fit with Camilo Sandhu at www.hiphopfit. net     Apps for on the [place] Depollution 7 Minute Workout (orange box with white 7) - free on the go HIIT training alley  Peloton Alley @ www. American Science and Engineering     Nutrition Trackers and Tools  LoseIT! And My Fitness Pal apps and on line for tracking nutrition  NOOM - virtual health coaching  FitFoundation (healthy meals on the go) in San[employer] @ www. jbzzqggohdncn2t. Jennifer Mantilla MD @ www.NuhookdTheInfoPro and Cipriano Tuttle (keto and low carb plans recommended) @ www. DTVKUN15.UKA, Metabolic Meals @ www. PrivarisMetabolicMeals. com - individual prepared meals to go  iCrumz, B-Bridge International, International Business Machines, Every Plate, Stubmatic- on line meal delivery programs for preparation at home  [place] in [place] for homemade meals to go @ wwwBABADU  Diet Doctor @ www. dietdoctor. SocialDiabetes - low carb swaps  Yummly - meal prep and planning alley (www.yummly. com)     Stress Management/Behavior/Mindful Eating  CALM meditation alley (wwwReactX)  Headspace  Am I Hungry? Mindful eating virtual  alley  Www.yourweightmatters. org - Obesity Action Coalition sponsored Blog posts daily  Motivation alley (black box with white \")- daily supportive messages sent to your phone     Books/Video Education/Podcasts  Mindless Eating by Ange Jovel  Why We Get Sick by Amy Felix (a book about insulin resistance)  Atomic Habits by Jamie Fernandez (a book about taking small steps to promote greater behavior change)   Can't Hurt Me by Kathie Sparks (a book exploring the power of discipline in achieving your goals)  The End of Dieting: How to Live for Life by Dr. Catherine Heaton M.D. or listen to The [facility] Episode 61: Understanding \"Nutritarian\" Eating w/Dr. Catherine Heaton  Your Body in Balance: The World Fuel Services Corporation of Food, Hormones, and Health by Dr. Sherie Manzanares  The Menopause Diet Plan by Clarisse Haq and [facility] HOSP AT [facility]  The Complete Guide to fasting by Dr. Annia Whitley, [address] [facility] by Mary Lowe, Ph.D, R.D.   Weight Loss Surgery Will Not Treat Food Addiction by Jacqueline Zambrano Ph.D  The Game Changers- Eight Dimension Corporation Documentary on plant based nutrition  Fed Up - documentary about obesity (Free on Utube)  The Truth About Sugar - documentary on sugar (Free on Utube, https://youtu. be/6K2gdxbHY7o)  The Dr. Addy Sampson by Dr. Loraine Awan MD  Fitlosophy Fitspiration - journal to better health (found at [employer] in fitness aisle)  What Happened to You?- a look at the impact trauma has on behavior written by Spencer Olvera and Dr. Sigala Rolling Again by Roshni Alexander - discovering your true self after trauma  Kota Camargo talk on [facility], The Call to BridgeLux  Podcasts: The Exam Room by the Physician's Committee, Nutrition Facts by Dr. Daryl Giron    We are here to support you with weight loss, but please remember that you still need your primary care provider for your routine health maintenance.

## 2023-08-01 ENCOUNTER — OFFICE VISIT (OUTPATIENT)
Dept: INTERNAL MEDICINE CLINIC | Facility: CLINIC | Age: 43
End: 2023-08-01
Payer: COMMERCIAL

## 2023-08-01 VITALS
SYSTOLIC BLOOD PRESSURE: 124 MMHG | HEART RATE: 82 BPM | HEIGHT: 65 IN | DIASTOLIC BLOOD PRESSURE: 82 MMHG | WEIGHT: 140 LBS | BODY MASS INDEX: 23.32 KG/M2 | RESPIRATION RATE: 16 BRPM | OXYGEN SATURATION: 98 %

## 2023-08-01 DIAGNOSIS — Z51.81 THERAPEUTIC DRUG MONITORING: Primary | ICD-10-CM

## 2023-08-01 DIAGNOSIS — E66.3 OVERWEIGHT (BMI 25.0-29.9): ICD-10-CM

## 2023-08-01 DIAGNOSIS — R63.2 BINGE EATING: ICD-10-CM

## 2023-08-01 DIAGNOSIS — E55.9 VITAMIN D DEFICIENCY: ICD-10-CM

## 2023-08-01 PROCEDURE — 99213 OFFICE O/P EST LOW 20 MIN: CPT | Performed by: NURSE PRACTITIONER

## 2023-08-01 PROCEDURE — 3079F DIAST BP 80-89 MM HG: CPT | Performed by: NURSE PRACTITIONER

## 2023-08-01 PROCEDURE — 3008F BODY MASS INDEX DOCD: CPT | Performed by: NURSE PRACTITIONER

## 2023-08-01 PROCEDURE — 3074F SYST BP LT 130 MM HG: CPT | Performed by: NURSE PRACTITIONER

## 2023-08-01 NOTE — PATIENT INSTRUCTIONS
Next steps:  1. Fill your prescribed medication and take as discussed and prescribed: vyvanse 40mg  2. Schedule a personal nutrition consultation with one of our registered dieticians       1. Drink water with meals and throughout the day, cut down on soda and/or juice if consumed. Consider flavored water options like Bubbly, Spindrift, Hint and Aaron. 2.  Eat breakfast daily and focus on having protein with each meal, examples include: greek yogurt, cottage cheese, hard boiled egg, whole grain toast with peanut butter. 3.  Reduce refined carbohydrates and sugars which includes items such as sweets, as well as rice, pasta, and bread and make sure to choose whole grain options when having them with just 1 serving per meal about the size of your inner palm. 4.  Consume non starchy veggies daily working towards making them a good 50% of your daily food intake. Add them to lunch and dinner consistently. 5.  Start a daily probiotic: VSL#3 is recommended, (order on line at www.vsl3. com). Take 1 capsule daily with water for 30 days, then reduce to 1 every other day (this will reduce the cost). Capsules can be left out for 2 weeks, but then must be refrigerated. Please download marianne My Fitness PeeP Mobile Digitalelishae Farzana! Or Net Diary to monitor daily dietary intake and you will be able to see if you are eating the right amount of calories or too much or too little which would hinder weight loss. Additionally this will help to see your daily carbohydrate and protein intake. When you set the marianne up choose 1-2 lbs/week as a goal.  Keeping a paper food journal is an option as well to remain accountable for your choices- this is the start to mindful eating! A low calorie diet has been consistently shown to support weight loss. Continue or start exercising to help establish a routine. If not already exercising begin with 1 day and progress as able with long-term goal of 30 minutes 5 days a week at a minimum.      Meditation daily can help manage and control stress. Chronic stress can make weight loss difficult. Exercising is one way to help with stress, but meditation using the CALM Alley or another comparable alternative can be done in your home or place of work with little time commitment. This Alley can also help work on behavior change and improve sleep. Check out the segment under Calm Masterclass and listen to The 4 Pillars of Health. A great way to begin learning about the foundation of lifestyle with practical tips to use in your every day. Check out www.yourweightmatters. org blog for continued daily support and education along this weight loss journey! Patient Resources:     Personal Training/Fitness Classes/Health Coaching     [facility] DEBBIE and Ronnie Rapp @ [email or web address]/ Full fitness center with group fitness and personal training. Discount available as client of [facility]. Health Coaching and Personal Training with Niko Bean at our [facility]- individual weekly coaching with option to add personal training and small group fitness classes targeted at weight loss- [phone] and/or email @ Mary Olivares. [email or web address]  360FIT [place] [email or web address] Group Fitness [phone] and/or email Kush Smith at [email or web address]. TopFachhandel UG  [address] with multiple locations: Aetna (www.Sauce Labs), Eat The SoBiz10 Fitness (www.Pigafe. TopFachhandel UG), Fit Body Bootcamp (www.We Are HuntedbodybootSocial Recruitingp.TopFachhandel UG), Outside.in (www.Techtium), The Exercise  (www.exercisecoach.TopFachhandel UG)     Online Fitness  Fitness  on Whole Foods in 10 DVD series- www. pxkrc98QBS. TopFachhandel UG  Sit and Be Fit - Chair exercise series Www.sitandbefit. org  Hip Hop Fit with Camilo Sandhu at www.hiphopfit. net     Apps for on the tagUin 7 Minute Workout (orange box with white 7) - free on the go HIIT training alley  Peloton Alley @ www. A4 Data     Nutrition Trackers and Tools  LoseIT! And My Fitness Pal apps and on line for tracking nutrition  NOOM - virtual health coaching  FitFoundation (healthy meals on the go) in San[employer]I @ www. qpcipzscmzkpe0o. Evangelist Felix MD @ www.Whale ImagingtromdCathy's Business Services and Sherie Funk (keto and low carb plans recommended) @ www. NCNOTN65.JYR, Metabolic Meals @ www. MyMetabolicMeals. com - individual prepared meals to go  Twenga, RVR Systems, International Business Machines, Every Plate, 140 Proof- on line meal delivery programs for preparation at home  [place] Capt[place] in [place] for homemade meals to go @ wwwKYTOSAN USA. Collected Inc.  Diet Doctor @ www. dietdoctor. Collected Inc. - low carb swaps  Yummly - meal prep and planning alley (www.yummly. com)     Stress Management/Behavior/Mindful Eating  CALM meditation alley (www.[employer])  Headspace  Am I Hungry? Mindful eating virtual  alley  Www.yourweightmatters. org - Obesity Action Coalition sponsored Blog posts daily  Motivation alley (black box with white \")- daily supportive messages sent to your phone     Books/Video Education/Podcasts  Mindless Eating by Leida Calderon  Why We Get Sick by Michaela Camacho (a book about insulin resistance)  Atomic Habits by Evertt Barthel (a book about taking small steps to promote greater behavior change)   Can't Hurt Me by Lay Head (a book exploring the power of discipline in achieving your goals)  The End of Dieting: How to Live for Life by Dr. Jerardo Chen M.D. or listen to The [facility] Episode 61: Understanding \"Nutritarian\" Eating w/Dr. Jerardo Chen  Your Body in Balance: The World Fuel Services Corporation of Food, Hormones, and Health by Dr. Cherie Pantoja  The Menopause Diet Plan by Nicolasa Bloch and [facility] HOSP AT [facility]  The Complete Guide to fasting by Dr. Mercy Ramos, [facility] [facility] by Felipa Dickey, Ph.D, R.D.   Weight Loss Surgery Will Not Treat Food Addiction by Dale Wadsworth Ph.D  The Game Changers- D-Sight Documentary on plant based nutrition  Fed Up - documentary about obesity (Free on Utube)  The Truth About Sugar - documentary on sugar (Free on Utube, https://youtu. be/1R1arklSS3u)  The Dr. Hiwot Haynes by Dr. Isa Gonzalez MD  Fitlosophy Fitspiration - journal to better health (found at [employer] in fitness aisle)  What Happened to You?- a look at the impact trauma has on behavior written by Erica Lopze and Dr. Villatoro Ours Again by Gris Mayberry - discovering your true self after trauma  Claudia Mustafa talk on LIANAI, The Call to Courage  Podcasts: The Exam Room by the Physician's Committee, Nutrition Facts by Dr. Cammie Sanford    We are here to support you with weight loss, but please remember that you still need your primary care provider for your routine health maintenance.

## 2023-10-17 ENCOUNTER — OFFICE VISIT (OUTPATIENT)
Dept: INTERNAL MEDICINE CLINIC | Facility: CLINIC | Age: 43
End: 2023-10-17
Payer: COMMERCIAL

## 2023-10-17 VITALS
OXYGEN SATURATION: 100 % | SYSTOLIC BLOOD PRESSURE: 122 MMHG | RESPIRATION RATE: 18 BRPM | HEIGHT: 65 IN | DIASTOLIC BLOOD PRESSURE: 82 MMHG | WEIGHT: 140 LBS | HEART RATE: 96 BPM | BODY MASS INDEX: 23.32 KG/M2

## 2023-10-17 DIAGNOSIS — E66.3 OVERWEIGHT (BMI 25.0-29.9): ICD-10-CM

## 2023-10-17 DIAGNOSIS — E55.9 VITAMIN D DEFICIENCY: ICD-10-CM

## 2023-10-17 DIAGNOSIS — R63.2 BINGE EATING: ICD-10-CM

## 2023-10-17 DIAGNOSIS — Z51.81 THERAPEUTIC DRUG MONITORING: Primary | ICD-10-CM

## 2023-10-17 PROCEDURE — 3074F SYST BP LT 130 MM HG: CPT | Performed by: NURSE PRACTITIONER

## 2023-10-17 PROCEDURE — 99213 OFFICE O/P EST LOW 20 MIN: CPT | Performed by: NURSE PRACTITIONER

## 2023-10-17 PROCEDURE — 3008F BODY MASS INDEX DOCD: CPT | Performed by: NURSE PRACTITIONER

## 2023-10-17 PROCEDURE — 3079F DIAST BP 80-89 MM HG: CPT | Performed by: NURSE PRACTITIONER

## 2023-10-17 RX ORDER — LISDEXAMFETAMINE DIMESYLATE CAPSULES 30 MG/1
30 CAPSULE ORAL EVERY MORNING
Qty: 30 CAPSULE | Refills: 0 | Status: SHIPPED | OUTPATIENT
Start: 2023-10-30

## 2023-10-17 NOTE — PATIENT INSTRUCTIONS
Next steps:  1. Fill your prescribed medication and take as discussed and prescribed: vyvanse 30mg  2. Schedule a personal nutrition consultation with one of our registered dieticians       1. Drink water with meals and throughout the day, cut down on soda and/or juice if consumed. Consider flavored water options like Bubbly, Spindrift, Hint and Aaron. 2.  Eat breakfast daily and focus on having protein with each meal, examples include: greek yogurt, cottage cheese, hard boiled egg, whole grain toast with peanut butter. 3.  Reduce refined carbohydrates and sugars which includes items such as sweets, as well as rice, pasta, and bread and make sure to choose whole grain options when having them with just 1 serving per meal about the size of your inner palm. 4.  Consume non starchy veggies daily working towards making them a good 50% of your daily food intake. Add them to lunch and dinner consistently. 5.  Start a daily probiotic: VSL#3 is recommended, (order on line at www.vsl3. com). Take 1 capsule daily with water for 30 days, then reduce to 1 every other day (this will reduce the cost). Capsules can be left out for 2 weeks, but then must be refrigerated. Please download marianne My Fitness MarCliq Prim! Or Net Diary to monitor daily dietary intake and you will be able to see if you are eating the right amount of calories or too much or too little which would hinder weight loss. Additionally this will help to see your daily carbohydrate and protein intake. When you set the marianne up choose 1-2 lbs/week as a goal.  Keeping a paper food journal is an option as well to remain accountable for your choices- this is the start to mindful eating! A low calorie diet has been consistently shown to support weight loss. Continue or start exercising to help establish a routine. If not already exercising begin with 1 day and progress as able with long-term goal of 30 minutes 5 days a week at a minimum.      Meditation daily can help manage and control stress. Chronic stress can make weight loss difficult. Exercising is one way to help with stress, but meditation using the CALM Alley or another comparable alternative can be done in your home or place of work with little time commitment. This Alley can also help work on behavior change and improve sleep. Check out the segment under Calm Masterclass and listen to The 4 Pillars of Health. A great way to begin learning about the foundation of lifestyle with practical tips to use in your every day. Check out www.yourweightmatters. org blog for continued daily support and education along this weight loss journey! Patient Resources:     Personal Training/Fitness Classes/Health Coaching     [address] and [place] @ [email or web address]/ Full fitness center with group fitness and personal training. Discount available as client of [facility]. Health Coaching and Personal Training with Danyelannie Givens at our [facility]- individual weekly coaching with option to add personal training and small group fitness classes targeted at weight loss- [phone] and/or email @ [email or web address]  360FIT [place] [email or web address]://[email or web address]-[email or web address] Media [email or web address]/. Group Fitness [phone] and/or email Joyce Yang at [email or web address]  [address] [facility] with multiple locations: Aetna (www.Enobia Pharma), Eat The Spot On Networks (www.Bia. Legacy Consulting and Development), Fit Body Bootcamp (www.Vigixp.Legacy Consulting and Development), Vuv Analytics (www.Entrepreneur Education Management Corporation), The Exercise  (www.exercisecoach.Legacy Consulting and Development)     Online Fitness  Fitness  on Whole Foods in 10 DVD series- www. dxocz15NCI. Legacy Consulting and Development  Sit and Be Fit - Chair exercise series Www.sitandbefit. org  Hip Hop Fit with Camilo Sandhu at www.hiphopfit. net     Apps for on the CIQUAL 7 Minute Workout (orange box with white 7) - free on the go HIIT training alley  Peloton Alley @ www. DigitalPost Interactive     Nutrition Trackers and Tools  LoseIT! And My Fitness Pal apps and on line for tracking nutrition  NOOM - virtual health coaching  FitFoundation (healthy meals on the go) in Sanmina-[employer]I @ www. doxvukvovyucf2r. Shannon Barron MD @ www.GeoQuiptrCoinJard.com and Eliud Bentley (keto and low carb plans recommended) @ www. EEAHLP89.DOY, Metabolic Meals @ www. MyMetabolicMeals. com - individual prepared meals to go  Ku6, PubMatic, International Business Machines, Every Plate, Graceway Pharma- on line meal delivery programs for preparation at home  [place] in [place] for homemade meals to go @ wwwOmniGuide. Afterschool.me  Diet Doctor @ www. dietdoctor. Afterschool.me - low carb swaps  Yummly - meal prep and planning alley (www.yummly. com)     Stress Management/Behavior/Mindful Eating  CALM meditation alley (www.CHiL Semiconductor)  Headspace  Am I Hungry? Mindful eating virtual  alley  Www.yourweightmatters. org - Obesity Action Coalition sponsored Blog posts daily  Motivation alley (black box with white \")- daily supportive messages sent to your phone     Books/Video Education/Podcasts  Mindless Eating by Sara Robbins  Why We Get Sick by Mahlon Lanes (a book about insulin resistance)  Atomic Habits by Queen Lulu (a book about taking small steps to promote greater behavior change)   Can't Hurt Me by Susana Izquierdo (a book exploring the power of discipline in achieving your goals)  The End of Dieting: How to Live for Life by Dr. Rabia Cisneros M.D. or listen to The [facility] Episode 61: Understanding \"Nutritarian\" Eating w/Dr. Rabia Cisneros  Your Body in Balance: The World Fuel Services Corporation of Food, Hormones, and Health by Dr. Misael Pyle  The Menopause Diet Plan by [facility] HOSP AT [facility]  The Complete Guide to fasting by Dr. Reese Epps, [facility] by Radha Phelps, Ph.D, R.D.   Weight Loss Surgery Will Not Treat Food Addiction by Michael Daly Ph.D  The Game Changers- Netflgrover Documentary on plant based nutrition  Fed Up - documentary about obesity (Free on Utube)  The Truth About Sugar - documentary on sugar (Free on Utube, https://youtu. be/2K6kyhaIN9u)  The Dr. Venus Jung by Dr. Ann-Marie Schmitz MD  Fitlosophy Fitspiration - journal to better health (found at [employer] in fitness aisle)  What Happened to You?- a look at the impact trauma has on behavior written by Lisa Menjivar and Dr. Vicki Cleary Again by Matthew Marr - discovering your true self after trauma  Livan English talk on ICON Aircraft, The Call to PushToTest  Podcasts: The Exam Room by the Physician's Committee, Nutrition Facts by Dr. Dodson Lively    We are here to support you with weight loss, but please remember that you still need your primary care provider for your routine health maintenance.

## 2024-02-19 ENCOUNTER — TELEPHONE (OUTPATIENT)
Dept: INTERNAL MEDICINE CLINIC | Facility: HOSPITAL | Age: 44
End: 2024-02-19

## 2024-02-19 NOTE — TELEPHONE ENCOUNTER
Outside Covid Testing done    Results and RTW guidelines:    COVID RESULT reported:      Test type:    [] Rapid outside         [] PCR outside       [x] Home Test    Date of test: 2/17/24      Test location: home test         [] Result viewed in Epic with verbal consent received from employee     [x] Results per Employee Covid Dashboard    [] Employee instructed to email copy of result to [email or web address]       [] Discussed with employee     [] Unable to reach by phone.  Sent via Aorato message      [x] Positive    - Employee should quarantine at home for at least 5 days (day 1 is day after sx onset) , follow the CDC guidelines for cleaning and                              quarantining; see CDC.gov   -This employee may RTW on day 6 if asymptomatic or mildly symptomatic (with improving symptoms).  Call Employee Health on day 5 if unable to return on                      day 6 after symptom onset.    -This employee needs to call Employee Health on day 5 after symptom onset.  The employee needs to be cleared by Employee Health.  - If Employee is still experiencing severe symptoms must make a RTW appt with Employee Health, Employee will not be cleared if:    1. Has consistent cough, shortness of breath or fatigue that restricts your physical activities    2. Is still feeling \"unwell\"    3. Within 15 days of hospitalization for COVID    4. Within 20 days of intubation for COVID    5. Still has a fever, vomiting or diarrhea   - Keep communication open with management about RTW and if symptoms worsen    - Monitor sx and temperature    - Employee should quarantine at home for at least 5 days from sx onset, follow the CDC guidelines for cleaning and quarantining; see CDC.gov                  - Notify PCP of result                 - Seek emergent care with worsening symptoms        Notes:     RTW PLAN:    [x]  If COVID positive results, off work minimum of 5 days from positive test or onset of symptoms (day 0)         On day 5, if asymptomatic or mildly symptomatic (with improving symptoms) may return to work day 6          On day 5, if symptomatic, call Employee Health for RTW screening        []  COVID positive result - call Employee Health on day 5 after symptom onset.  The employee needs to be cleared by Employee Health to RTW.  [] RTW immediately, continue to monitor for sx  [] RTW when sx improve; must be fever free for 24 hours w/o medications, Diarrhea/Vomiting free for 24 hours w/o medications  [] Alinity ordered; continue to monitor sx and call for new/worsening sx.  Discuss RTW guidelines with manager             [] Rapid ordered to confirm home negative.   [] May continue to work  [] Follow up with PCP  [] Home until further instruction from hotline with Alinity results  INSTRUCTIONS PROVIDED:  [x]  Plan as noted above  []  Length of time to obtain results  []  May return to work if views negative in My Chart and  remains fever, vomiting, and diarrhea free  []  May continue to work if remains asymptomatic   [x]  Quarantine instructions  [x]  Masking protocol  [x]  S/S of worsening infection/condition and importance of prompt medical re-evaluation including when to seek emergency care  [] If symptoms develop, stay home and call hotline for rapid test order    Estimated RTW date:  2/23/24  [x] Manager notified        [x] EH  []LORENZO   [] [facility]  Manager : Santi Carlos    [] Direct Patient Care  []Indirect Patient Contact   [x] Non-Clinical/No Patient Contact    For Direct Patient Care ONLY: Have you been fitted with an N95 mask? [] Yes  []No   -> n/a    HAVE YOU RECEIVED THE COVID-19 Vaccine? Yes [x]    No []          If yes, date(s) received:        12/18/20, 1/8/21, 10/26/21    Which vaccine:  Pfizer [x]     Moderna []    J&J []      SYMPTOMS (reported via dashboard):  [] asymptomatic  [x] symptomatic  [] GI symptoms only    Symptom onset date: 2/17/24    Fever   > 100F             Yes [x]      Cough                           Yes [x]      Shortness of breath  Yes []      Congestion                 Yes [x]      Runny nose                Yes [x]        Loss of Smell              Yes [x]        Loss of Taste             Yes [x]       Sore throat                 Yes [x]       Fatigue                        Yes [x]       Body Aches                Yes [x]        Chills                           Yes []        Headache                   Yes [x]             GI symptoms             Yes []     No [x]                     Nausea   []          Vomiting            []                                    Diarrhea  []          Upset stomach []      Employee reported COVID Exposure?  Yes []     No [x]    Date of exposure:   []  Coworker                       [] patient                        [] Family/friend    PPE:   [] N95 Mask/PAPR  [] Standard Mask  [] Eyewear  [] None    Within 6 feet for >15 minutes? [] Yes []  No    Is this a true exposure? []  Yes []  No    When was the last shift you worked?:    Employee has a history of Covid?  Yes []     No []   If Yes, when:    Notes:

## 2024-11-19 ENCOUNTER — OFFICE VISIT (OUTPATIENT)
Dept: INTERNAL MEDICINE CLINIC | Facility: CLINIC | Age: 44
End: 2024-11-19
Payer: COMMERCIAL

## 2024-11-19 VITALS
SYSTOLIC BLOOD PRESSURE: 122 MMHG | WEIGHT: 162 LBS | RESPIRATION RATE: 16 BRPM | DIASTOLIC BLOOD PRESSURE: 80 MMHG | HEIGHT: 65 IN | HEART RATE: 96 BPM | BODY MASS INDEX: 26.99 KG/M2

## 2024-11-19 DIAGNOSIS — E66.3 OVERWEIGHT (BMI 25.0-29.9): ICD-10-CM

## 2024-11-19 DIAGNOSIS — Z51.81 THERAPEUTIC DRUG MONITORING: Primary | ICD-10-CM

## 2024-11-19 DIAGNOSIS — R63.2 BINGE EATING: ICD-10-CM

## 2024-11-19 DIAGNOSIS — E55.9 VITAMIN D DEFICIENCY: ICD-10-CM

## 2024-11-19 PROCEDURE — 99214 OFFICE O/P EST MOD 30 MIN: CPT | Performed by: NURSE PRACTITIONER

## 2024-11-19 RX ORDER — LISDEXAMFETAMINE DIMESYLATE 30 MG/1
30 CAPSULE ORAL DAILY
Qty: 30 CAPSULE | Refills: 0 | Status: SHIPPED | OUTPATIENT
Start: 2025-01-20 | End: 2025-02-19

## 2024-11-19 RX ORDER — LISDEXAMFETAMINE DIMESYLATE 30 MG/1
30 CAPSULE ORAL DAILY
Qty: 30 CAPSULE | Refills: 0 | Status: SHIPPED | OUTPATIENT
Start: 2024-12-20 | End: 2025-01-19

## 2024-11-19 RX ORDER — LISDEXAMFETAMINE DIMESYLATE 30 MG/1
30 CAPSULE ORAL DAILY
Qty: 30 CAPSULE | Refills: 0 | Status: SHIPPED | OUTPATIENT
Start: 2024-11-19 | End: 2024-12-19

## 2024-11-19 NOTE — PROGRESS NOTES
HISTORY OF PRESENT ILLNESS  Chief Complaint   Patient presents with    Weight Check     + 22 Last visit in 10/17/2023     Nesha Freeman is a 44 year old female here for follow up with medical weight loss program for the treatment of overweight, obesity, or morbid obesity.     Up #22 lbs (f/u from 10/2023)   Is not currently taking any medication for weight loss (was previously taking vyvanse)     Larger portions   Mindless eating   Exercise/Activity: 5x/ week, via walking/ jogging on treadmill, videos, dumbbells    Nutrition: eating regular meals, +protein, minimal veggies. not tracking reports   Meals out per week on average: 1  Stress is manageable   Sleep: 8 hours/night, waking up feeling rested    Denies chest pain, shortness of breath, dizziness, blurred vision, headache, paresthesia, nausea/vomiting.     Breakfast Lunch Dinner Snacks Fluids   Reviewed              Wt Readings from Last 6 Encounters:   11/19/24 162 lb (73.5 kg)   10/17/23 140 lb (63.5 kg)   08/01/23 140 lb (63.5 kg)   04/24/23 149 lb (67.6 kg)   01/24/23 165 lb (74.8 kg)   10/25/22 168 lb (76.2 kg)          REVIEW OF SYSTEMS  GENERAL: feels well otherwise, denied any fevers chills or night sweats   LUNGS: denies shortness of breath  CARDIOVASCULAR: denies chest pain  GI: denies abdominal pain  MUSCULOSKELETAL: denies back pain, joint pains   PSYCH: denies change in behavior or mood, denies feeling sad or depressed    EXAM  /80   Pulse 96   Resp 16   Ht 5' 5\" (1.651 m)   Wt 162 lb (73.5 kg)   BMI 26.96 kg/m²       GENERAL: well developed, well nourished, in no apparent distress, A/O x3  SKIN: no rashes, no suspicious lesions  HEENT: atraumatic, normocephalic, OP-clear, PERRLA  NECK: supple, no adenopathy  LUNGS: CTA in all fields, breathing non labored  CARDIO: RRR without murmur  GI: +BS, NT/ND, no masses or HSM  EXTREMITIES: no cyanosis, no clubbing, no edema    Lab Results   Component Value Date    GLU 87 05/10/2022    BUN 7  05/10/2022    BUNCREA NOT APPLICABLE 05/10/2022    CREATSERUM 0.66 05/10/2022     06/24/2017    GFRNAA 111 05/10/2022    GFRAA 129 05/10/2022    CA 9.2 05/10/2022    ALKPHO 73 05/10/2022    AST 18 05/10/2022    ALT 20 05/10/2022    BILT 0.4 05/10/2022    TP 7.4 05/10/2022    ALB 4.3 05/10/2022    GLOBULIN 3.1 05/10/2022    AGRATIO 1.4 05/10/2022     05/10/2022    K 4.1 05/10/2022     05/10/2022    CO2 24 05/10/2022     Lab Results   Component Value Date    A1C 5.6 05/10/2022     Lab Results   Component Value Date    CHOLEST 223 (H) 05/10/2022    TRIG 166 (H) 05/10/2022    HDL 53 05/10/2022     (H) 05/10/2022    TCHDLRATIO 4.2 05/10/2022    NONHDLC 170 (H) 05/10/2022     Lab Results   Component Value Date    VITB12 306 05/10/2022     Lab Results   Component Value Date    VITD 15 (L) 05/10/2022       Medications Ordered Prior to Encounter[1]    ASSESSMENT/PLAN    ICD-10-CM    1. Therapeutic drug monitoring  Z51.81       2. Overweight (BMI 25.0-29.9)  E66.3       3. Binge eating  R63.2       4. Vitamin D deficiency  E55.9           PLAN   Initial Weight Data and Goal Weight Loss:  Initial consult: # 191lbs on 5/2022  Weight Calculations  Initial Weight: 191 lbs  Initial Weight Date: 05/09/22  Today's Weight: 162 lbs  5% Goal: 9.55  10% Goal: 19.1  Total Weight Loss: 29 lbs  Total weight loss: up #22 lbs total, Net loss 29 lbs  Will restart medications: vyvanse 30mg   --advised of side effects and adverse effects of this medication  Contradictions: none  Reviewed labs   Binge eating, stable on vyvanse  Continue with vitamin d (OTC)  Continue with vitamin b12 complex  Continue with physical exercise and activity- encouraged to add in strength training   Wrote out macros and encouraged to track food   Nutrition: Low carb diet, recommended to eat breakfast daily/ regular protein intake  Follow up with dietitian and psychologist as recommended.  Discussed the role of sleep and stress in weight  management.  Counseled on comprehensive weight loss plan including attention to nutrition, exercise and behavior/stress management for success. See patient instruction below for more details.  Discussed strategies to overcome barriers to successful weight loss and weight maintenance  FITTE: ACSM recommendations (150-300 minutes/ week in active weight loss)   Weight Loss Consent to treat reviewed and signed.    Total time spent on chart review, pre-charting, obtaining history, counseling, and educating, reviewing labs was 30 minutes.       NOTE TO PATIENT: The 21st Century Cures Act makes clinical notes like these available to patients in the interest of transparency. Clinical notes are medical documents used by physicians and care providers to communicate with each other. These documents include medical language and terminology, abbreviations, and treatment information that may sound technical and at times possibly unfamiliar. In addition, at times, the verbiage may appear blunt or direct. These documents are one tool providers use to communicate relevant information and clinical opinions of the care providers in a way that allows common understanding of the clinical context.     There are no Patient Instructions on file for this visit.    No follow-ups on file.    Patient verbalizes understanding.    MIKE Ventura             [1]   Current Outpatient Medications on File Prior to Visit   Medication Sig Dispense Refill    levonorgestrel 20 MCG/24HR Intrauterine IUD 20 mcg (1 each total) by Intrauterine route once.       No current facility-administered medications on file prior to visit.

## 2024-11-19 NOTE — PATIENT INSTRUCTIONS
Next steps:  1.  Fill your prescribed medication and take as discussed and prescribed: vyvanse 30mg   2.  Schedule a personal nutrition consultation with one of our registered dieticians     Please try to work on the following dietary changes:  Daily protein recommendation to start:  grams  Daily carbohydrate: <100g  Daily calories: 1,200-1,300  1.  Drink water with meals and throughout the day, cut down on soda and/or juice if consumed. Consider flavored water options like Bubbly, Spindrift, Hint and Aaron.  2.  Eat breakfast daily and focus on having protein with each meal, examples include: greek yogurt, cottage cheese, hard boiled egg, whole grain toast with peanut butter.   3.  Reduce refined carbohydrates and sugars which includes items such as sweets, as well as rice, pasta, and bread and make sure to choose whole grain options when having them with just 1 serving per meal about the size of your inner palm.  4.  Consume non starchy veggies daily working towards making them a good 50% of your daily food intake. Add them to lunch and dinner consistently.  5.  Start a daily probiotic: VSL#3 is recommended, (order on line at www.vsl3.com). Take 1 capsule daily with water for 30 days, then reduce to 1 every other day (this will reduce the cost). Capsules can be left out for 2 weeks, but then must be refrigerated.      Please download marianne My Fitness Pal, LoseIt! Or Net Diary to monitor daily dietary intake and you will be able to see if you are eating the right amount of calories or too much or too little which would hinder weight loss. Additionally this will help to see your daily carbohydrate and protein intake. When you set the marianne up choose 1-2 lbs/week as a goal.  Keeping a paper food journal is an option as well to remain accountable for your choices- this is the start to mindful eating! A low calorie diet has been consistently shown to support weight loss.     Continue or start exercising to help  establish a routine. If not already exercising begin with 1 day and progress as able with long-term goal of 30 minutes 5 days a week at a minimum.     Meditation daily can help manage and control stress. Chronic stress can make weight loss difficult.  Exercising is one way to help with stress, but meditation using the CALM Alley or another comparable alternative can be done in your home or place of work with little time commitment. This Allye can also help work on behavior change and improve sleep. Check out the segment under Calm Masterclass and listen to The 4 Pillars of Health. A great way to begin learning about the foundation of lifestyle with practical tips to use in your every day.     Check out www.yourweightmatters.org blog for continued daily support and education along this weight loss journey!    Patient Resources:     Personal Training/Fitness Classes/Health Coaching     [facility] @ [email or web address] Full fitness center with group fitness and personal training. Discount available as client of [facility].  Health Coaching and Personal Training with Gaby Gilbert at our [facility]- individual weekly coaching with option to add personal training and small group fitness classes targeted at weight loss- [phone] and/or email @ [email or web address]  360FIT [place] [email or web address] [email or web address] [phone] and/or email Mary Ann at [email or web address]  [facility] Fitness Centers with multiple locations: Health2Sync (www.WebPT), Eat The [place]omas Fitness (www.Konutkredisi.com.tr.i-marker), Fit Body Bootcamp (www.Conventus OrthopaedicsbodybootJavelinp.i-marker), VideoPros (www.Dwellable.i-marker), The Exercise  (www.exercisecoach.i-marker)     Online Fitness  Fitness  on Utube  Fit in 10 DVD series- www.ohhmh34KDP.com  Sit and Be Fit - Chair exercise series Www.sitandbefit.org  Hip Hop  Fit with Gene Sandhu at www.hiphopfit.net     Apps for on the Go Fitness  [place] 7 Minute Workout (orange box with white 7) - free on the go HIIT training alley  Peloton Alley @ www.onepeloton.com     Nutrition Trackers and Tools  LoseIT! And My Fitness Pal apps and on line for tracking nutrition  NOOM - virtual health coaching  FitFoundation (healthy meals on the go) in [place] @ www.khtwypujubzxq6m.GiveGab  Bistro MD @ Populisbistromd.com and Gayiyj48 (keto and low carb plans recommended) @ www.xoqhwd67.com, Metabolic Meals @ www.MyMetabolicMeals.com - individual prepared meals to go  Gobble, Blue Apron, Home , Every Plate, Sunbasket- on line meal delivery programs for preparation at home  Meal Village in [place] for homemade meals to go @ www.mealvillage.GiveGab  Diet Doctor @ www.dietdoctor.GiveGab - low carb swaps  [employer] - meal prep and planning alley (www.yummly.com)     Stress Management/Behavior/Mindful Eating  CALM meditation alley (www.calm.com)  Headspace  Am I Hungry? Mindful eating virtual  alley  Www.yourweightmatters.org - Obesity Action Coalition sponsored Blog posts daily  Motivation alley (black box with white \")- daily supportive messages sent to your phone     Books/Video Education/Podcasts  Mindless Eating by He Carey  Why We Get Sick by Jacoby Membreno (a book about insulin resistance)  Atomic Habits by Campos Vargas (a book about taking small steps to promote greater behavior change)   Can't Hurt Me by Kushal Barlow (a book exploring the power of discipline in achieving your goals)  The End of Dieting: How to Live for Life by Dr. Alexandr Mccarty M.D. or listen to [employer] Podcast Episode 63: Understanding \"Nutritarian\" Eating w/Dr. Alexandr Mccarty  Your Body in Balance: The New Science of Food, Hormones, and Health by Dr. Davion Weathers  The Menopause Diet Plan by Dominique Magaña and Yarelis Orta  The Complete Guide to fasting by Dr. Bejarano  Sugar, Salt & Fat by Naomi Mc, Ph.D, R.D.  Weight Loss  Surgery Will Not Treat Food Addiction by Ines Saleh Ph.D  The Game Changers- [employer] Documentary on plant based nutrition  Fed Up - documentary about obesity (Free on Utube)  The Truth About Sugar - documentary on sugar (Free on Utube, https://youZonderu.be/4R0pircSB1c)  The Dr. Watt T5 Wellness Plan by Dr. Jose De Jesus Watt MD  Fitlosophy Fitspiration - journal to better health (found at [employer] in fitness aisle)  What Happened to You?- a look at the impact trauma has on behavior written by Keily Thomas and Dr. Sunday Pittman  Whole Again by Jw Thompson - discovering your true self after trauma  Miranda Douglas talk on [employer], The Call to Courage  Podcasts: The Exam Room by the Physician's Committee, Nutrition Facts by Dr. Muniz    We are here to support you with weight loss, but please remember that you still need your primary care provider for your routine health maintenance.

## 2025-03-10 DIAGNOSIS — Z51.81 THERAPEUTIC DRUG MONITORING: ICD-10-CM

## 2025-03-10 DIAGNOSIS — E66.3 OVERWEIGHT (BMI 25.0-29.9): ICD-10-CM

## 2025-03-10 DIAGNOSIS — R63.2 BINGE EATING: ICD-10-CM

## 2025-03-11 RX ORDER — LISDEXAMFETAMINE DIMESYLATE 30 MG/1
30 CAPSULE ORAL DAILY
Qty: 30 CAPSULE | Refills: 0 | Status: SHIPPED | OUTPATIENT
Start: 2025-03-11

## 2025-03-11 NOTE — TELEPHONE ENCOUNTER
Requesting vyvanse 30 mg  LOV: 11/19/25  RTC: 5 months  Last Relevant Labs: na  Filled: 1/20/25 #30 with 0 refills last [place] 1[place]8/25 [place]    Future Appointments   Date Time Provider Department Center   4/11/2025 10:40 AM Kiki Gomes APRN [facility] [address]   7/23/2025  2:40 PM Kiki Gomes APRN [facility] [address]

## 2025-04-10 NOTE — PROGRESS NOTES
[facility] WEIGHT MANAGEMENT VIRTUAL ENCOUNTER     Nesha Freeman verbally consents to a Virtual/Telephone Check-In service on 04/11/25   Patient understands and accepts financial responsibility for any deductible, co-insurance and/or co-pays associated with this service.    HISTORY OF PRESENT ILLNESS  No chief complaint on file.    Nesha Freeman is a 44 year old female is being evaluated as a video visit using Telemedicine with live, interactive video and audio    Weight gain/loss since LOV based on home monitoring:   Home scale: 160lbs  Has lost  #2 lbs since LOV 5 months ago     Compliance with medication: vyvanse 30mg  Tolerating well, helping with decreasing appetite and no side effects     Feels like the vyvanase has help with mindless eating   Has been trying to change to eat healthy (for the past week)- with other family members   Success: making a decision to eat right as a family and sticking to it  Challenging:     Staying consistent and meal planning to stay on track   Exercise/Activity: 6x/ week, via walking, weights (3 days per week)   Nutrition: eating regular meals, +protein, minimal veggies. not tracking reports  Stress is manageable   Sleep: 7 hours/night, waking up feeling rested    Denies chest pain, shortness of breath, dizziness, blurred vision, headache, paresthesia, nausea/vomiting.     Wt Readings from Last 6 Encounters:   11/19/24 162 lb (73.5 kg)   10/17/23 140 lb (63.5 kg)   08/01/23 140 lb (63.5 kg)   04/24/23 149 lb (67.6 kg)   01/24/23 165 lb (74.8 kg)   10/25/22 168 lb (76.2 kg)          Subjective  REVIEW OF SYSTEMS  GENERAL HEALTH: feels well otherwise, denied any fevers chills or night sweats   RESPIRATORY: denies shortness of breath   CARDIOVASCULAR: denies chest pain  GI: denies abdominal pain  PSYCH: denies any mood changes    Objective  EXAM  Reviewed most recent set of vitals   Physical Exam:  GENERAL: well developed, well nourished, in no apparent distress, speaking in  full sentences comfortably   SKIN: warm, pink, dry without rashes to exposed area   EYES: conjunctiva pink  HEENT: atraumatic, normocephalic  LUNGS: normal work of breathing, non labored  CARDIO: normal work, no exertion  EXTREMITIES: no cyanosis, no clubbing, no edema  NEURO: Oriented times three  PSYCH: pleasant, cooperative, normal mood and affect    Lab Results   Component Value Date    WBC 10.2 05/10/2022    RBC 4.15 05/10/2022    HGB 12.7 05/10/2022    HCT 37.8 05/10/2022    MCV 91.1 05/10/2022    MCH 30.6 05/10/2022    MCHC 33.6 05/10/2022    RDW 11.8 05/10/2022     05/10/2022    MPV 12.7 (H) 07/31/2012     Lab Results   Component Value Date    GLU 87 05/10/2022    BUN 7 05/10/2022    BUNCREA NOT APPLICABLE 05/10/2022    CREATSERUM 0.66 05/10/2022     06/24/2017    GFRNAA 111 05/10/2022    GFRAA 129 05/10/2022    CA 9.2 05/10/2022    ALKPHO 73 05/10/2022    AST 18 05/10/2022    ALT 20 05/10/2022    BILT 0.4 05/10/2022    TP 7.4 05/10/2022    ALB 4.3 05/10/2022    GLOBULIN 3.1 05/10/2022    AGRATIO 1.4 05/10/2022     05/10/2022    K 4.1 05/10/2022     05/10/2022    CO2 24 05/10/2022     Lab Results   Component Value Date    A1C 5.6 05/10/2022     Lab Results   Component Value Date    CHOLEST 223 (H) 05/10/2022    TRIG 166 (H) 05/10/2022    HDL 53 05/10/2022     (H) 05/10/2022    TCHDLRATIO 4.2 05/10/2022    NONHDLC 170 (H) 05/10/2022     Lab Results   Component Value Date    T4F 1.3 05/10/2022    TSH 1.22 05/10/2022     Lab Results   Component Value Date    VITB12 306 05/10/2022     Lab Results   Component Value Date    VITD 15 (L) 05/10/2022       Medications Ordered Prior to Encounter[1]    ASSESSMENT  Analyzed weight data:       Diagnoses and all orders for this visit:    Therapeutic drug monitoring    Overweight (BMI 25.0-29.9)    Binge eating    Vitamin D deficiency        PLAN  Continue with medications: vyvanse 30mg - will stay at this dosage  --advised of side effects  and adverse effects of this medication  Contradictions: none  Reviewed labs   Binge eating, stable on vyvanse  Continue with vitamin d (OTC)  Continue with vitamin b12 complex  Continue with physical exercise and strength training   Wrote out macros and encouraged to track food   Advised to monitor blood pressure and pulse at home/ given parameters to review and contact provider.  Nutrition: low carb diet/ recommended to eat breakfast daily/ regular protein intake  Follow up with dietitian and psychologist as recommended.  Discussed the role of sleep and stress in weight management.  Counseled on comprehensive weight loss plan including attention to nutrition, exercise and behavior/stress management for success. See patient instruction below for more details.  Discussed strategies to overcome barriers to successful weight loss and weight maintenance  FITTE: ACSM recommendations (150-300 minutes/ week in active weight loss)       There are no Patient Instructions on file for this visit.    No follow-ups on file.    Patient verbalizes understanding.    Total time spent on chart review, pre-charting, obtaining history, counseling, and educating, reviewing labs was 21 minutes.       Pt understands phone/video evaluation is not a substitute for face to face examination or emergency care. Pt advised to go to the ER or call 911 for worsening symptoms or acute distress.       Please note that the following visit was completed using two-way, real-time interactive audio and/or video communication.  This has been done in good ruben to provide continuity of care in the best interest of the provider-patient relationship, due to the ongoing public health crisis/national emergency and because of restrictions of visitation.  There are limitations of this visit as no physical exam could be performed.  Every conscious effort was taken to allow for sufficient and adequate time.  This billing was spent on reviewing labs, medications,  radiology tests and decision making.  Appropriate medical decision-making and tests are ordered as detailed in the plan of care above.     NOTE TO PATIENT: The 21st Century Cures Act makes clinical notes like these available to patients in the interest of transparency. Clinical notes are medical documents used by physicians and care providers to communicate with each other. These documents include medical language and terminology, abbreviations, and treatment information that may sound technical and at times possibly unfamiliar. In addition, at times, the verbiage may appear blunt or direct. These documents are one tool providers use to communicate relevant information and clinical opinions of the care providers in a way that allows common understanding of the clinical context.     Kiki Gomes, APRN  4/10/2025         [1]   Current Outpatient Medications on File Prior to Visit   Medication Sig Dispense Refill    LISDEXAMFETAMINE 30 MG Oral Cap TAKE ONE CAPSULE BY MOUTH ONE TIME DAILY 30 capsule 0    levonorgestrel 20 MCG/24HR Intrauterine IUD 20 mcg (1 each total) by Intrauterine route once.       No current facility-administered medications on file prior to visit.

## 2025-04-11 ENCOUNTER — TELEMEDICINE (OUTPATIENT)
Dept: INTERNAL MEDICINE CLINIC | Facility: CLINIC | Age: 45
End: 2025-04-11
Payer: COMMERCIAL

## 2025-04-11 DIAGNOSIS — R63.2 BINGE EATING: ICD-10-CM

## 2025-04-11 DIAGNOSIS — Z51.81 THERAPEUTIC DRUG MONITORING: Primary | ICD-10-CM

## 2025-04-11 DIAGNOSIS — E66.3 OVERWEIGHT (BMI 25.0-29.9): ICD-10-CM

## 2025-04-11 DIAGNOSIS — E55.9 VITAMIN D DEFICIENCY: ICD-10-CM

## 2025-04-11 RX ORDER — LISDEXAMFETAMINE DIMESYLATE 30 MG/1
30 CAPSULE ORAL DAILY
Qty: 30 CAPSULE | Refills: 0 | Status: SHIPPED | OUTPATIENT
Start: 2025-05-12 | End: 2025-06-11

## 2025-04-11 RX ORDER — LISDEXAMFETAMINE DIMESYLATE 30 MG/1
30 CAPSULE ORAL DAILY
Qty: 30 CAPSULE | Refills: 0 | Status: SHIPPED | OUTPATIENT
Start: 2025-04-11 | End: 2025-05-11

## 2025-04-11 RX ORDER — LISDEXAMFETAMINE DIMESYLATE 30 MG/1
30 CAPSULE ORAL DAILY
Qty: 30 CAPSULE | Refills: 0 | Status: SHIPPED | OUTPATIENT
Start: 2025-06-12 | End: 2025-07-12

## 2025-04-11 NOTE — PATIENT INSTRUCTIONS
Next steps:  1.  Fill your prescribed medication and take as discussed and prescribed: vyvanse 30mg  2.  Schedule a personal nutrition consultation with one of our registered dieticians     Please try to work on the following dietary changes:    1.  Drink water with meals and throughout the day, cut down on soda and/or juice if consumed. Consider flavored water options like Bubbly, Spindrift, Hint and Aaron.  2.  Eat breakfast daily and focus on having protein with each meal, examples include: greek yogurt, cottage cheese, hard boiled egg, whole grain toast with peanut butter.   3.  Reduce refined carbohydrates and sugars which includes items such as sweets, as well as rice, pasta, and bread and make sure to choose whole grain options when having them with just 1 serving per meal about the size of your inner palm.  4.  Consume non starchy veggies daily working towards making them a good 50% of your daily food intake. Add them to lunch and dinner consistently.  5.  Start a daily probiotic: VSL#3 is recommended, (order on line at www.vsl3.com). Take 1 capsule daily with water for 30 days, then reduce to 1 every other day (this will reduce the cost). Capsules can be left out for 2 weeks, but then must be refrigerated.      Please download marianne My Fitness Pal, LoseIt! Or Net Diary to monitor daily dietary intake and you will be able to see if you are eating the right amount of calories or too much or too little which would hinder weight loss. Additionally this will help to see your daily carbohydrate and protein intake. When you set the marianne up choose 1-2 lbs/week as a goal.  Keeping a paper food journal is an option as well to remain accountable for your choices- this is the start to mindful eating! A low calorie diet has been consistently shown to support weight loss.     Continue or start exercising to help establish a routine. If not already exercising begin with 1 day and progress as able with long-term goal of 30  minutes 5 days a week at a minimum.     Meditation daily can help manage and control stress. Chronic stress can make weight loss difficult.  Exercising is one way to help with stress, but meditation using the CALM Alley or another comparable alternative can be done in your home or place of work with little time commitment. This Alley can also help work on behavior change and improve sleep. Check out the segment under Calm Masterclass and listen to The 4 Pillars of Health. A great way to begin learning about the foundation of lifestyle with practical tips to use in your every day.     Check out www.yourweightmatters.org blog for continued daily support and education along this weight loss journey!    Patient Resources:     Personal Training/Fitness Classes/Health Coaching     [facility] @ [email or web address] Full fitness center with group fitness and personal training. Discount available as client of [facility].  Health Coaching and Personal Training with Gaby Gilbert at our [facility]- individual weekly coaching with option to add personal training and small group fitness classes targeted at weight loss- [phone] and/or email @ [email or web address]  360FIT [place] [email or web address]. Group Fitness [phone] and/or email Mary Ann at [email or web address]  FrancLists of hospitals in the [place]ed Fitness Centers with multiple locations: Innovatient Solutions (www.Performance Horizon Group), Eat The Search123 Fitness (www.CHAINels.BrainRush), Fit Body Bootcamp (www.PatientPay Inc.p.BrainRush), Bringrr (www.Booklr), The Exercise  (www.exercisecoach.BrainRush)     Online Fitness  Fitness  on Utube  Fit in 10 DVD series- www.ysxjs27REB.com  Sit and Be Fit - Chair exercise series Www.sitandbefit.org  Hip Hop Fit with Camilo Sandhu at www.hiphopfit.net     Apps for on the Go Fitness  [place] 7 Minute Workout (orange box  with white 7) - free on the go HIIT training alley  Peloton Alley @ www.onepeloton.com     Nutrition Trackers and Tools  LoseIT! And My Fitness Pal apps and on line for tracking nutrition  NOOM - virtual health coaching  FitFoundation (healthy meals on the go) in [place] @ www.bwshznzwieuzu8m.Chuguobang  Lorin MD @ www.bistromd.com and Mcejik40 (keto and low carb plans recommended) @ www.icmgbg10.com, Metabolic Meals @ www.MyMetabolicMeals.com - individual prepared meals to go  Gobble, Blue Apron, Home , Every Plate, Sunbasket- on line meal delivery programs for preparation at home  Meal Village in [place] for homemade meals to go @ www.mealAndro Diagnosticsllage.Chuguobang  Diet Doctor @ www.dietdoctor.Chuguobang - low carb swaps  Yummly - meal prep and planning alley (www.yummly.com)     Stress Management/Behavior/Mindful Eating  CALM meditation alley (www.calm.com)  Headspace  Am I Hungry? Mindful eating virtual  alley  Www.yourweightmatters.org - Obesity Action Coalition sponsored Blog posts daily  Motivation alley (black box with white \")- daily supportive messages sent to your phone     Books/Video Education/Podcasts  Mindless Eating by He Carey  Why We Get Sick by Jacoby Membreno (a book about insulin resistance)  Atomic Habits by Campos Vargas (a book about taking small steps to promote greater behavior change)   Can't Hurt Me by Kushal Barlow (a book exploring the power of discipline in achieving your goals)  The End of Dieting: How to Live for Life by Dr. Alexandr Mccarty M.D. or listen to The L4 Mobile Podcast Episode 63: Understanding \"Nutritarian\" Eating w/Dr. Alexandr Mccarty  Your Body in Balance: The New Science of Food, Hormones, and Health by Dr. Davion Weathers  The Menopause Diet Plan by Dominique Magaña and Yarelis Orta  The Complete Guide to fasting by Dr. Bejarano  Sugar, Salt & Fat by Naomi Mc, Ph.D, R.D.  Weight Loss Surgery Will Not Treat Food Addiction by Ines Saleh Ph.D  The Game Changers- Netflix Documentary on  plant based nutrition  Fed Up - documentary about obesity (Free on Utube)  The Truth About Sugar - documentary on sugar (Free on Utube, https://youtu.be/0Y5yhtqVR6d)  The Dr. Watt T5 Wellness Plan by Dr. Jose De Jesus Watt MD  Fitlosophy Fitspiration - journal to better health (found at [employer] in fitness aisle)  What Happened to You?- a look at the impact trauma has on behavior written by Keily Thomas and Dr. Sunday Pittman  Whole Again by Jw Thompson - discovering your true self after trauma  Miranda Douglas talk on [employer], The Call to ValenTx  Podcasts: The Exam Room by the Physician's Committee, Nutrition Facts by Dr. Muniz    We are here to support you with weight loss, but please remember that you still need your primary care provider for your routine health maintenance.

## 2025-07-23 ENCOUNTER — OFFICE VISIT (OUTPATIENT)
Dept: INTERNAL MEDICINE CLINIC | Facility: CLINIC | Age: 45
End: 2025-07-23
Payer: COMMERCIAL

## 2025-07-23 VITALS
HEIGHT: 65 IN | RESPIRATION RATE: 18 BRPM | HEART RATE: 118 BPM | WEIGHT: 146 LBS | DIASTOLIC BLOOD PRESSURE: 82 MMHG | SYSTOLIC BLOOD PRESSURE: 126 MMHG | BODY MASS INDEX: 24.32 KG/M2 | OXYGEN SATURATION: 99 %

## 2025-07-23 DIAGNOSIS — R63.2 BINGE EATING: ICD-10-CM

## 2025-07-23 DIAGNOSIS — E66.3 OVERWEIGHT (BMI 25.0-29.9): ICD-10-CM

## 2025-07-23 DIAGNOSIS — Z51.81 THERAPEUTIC DRUG MONITORING: Primary | ICD-10-CM

## 2025-07-23 DIAGNOSIS — E55.9 VITAMIN D DEFICIENCY: ICD-10-CM

## 2025-07-23 PROCEDURE — 99214 OFFICE O/P EST MOD 30 MIN: CPT | Performed by: NURSE PRACTITIONER

## 2025-07-23 RX ORDER — LISDEXAMFETAMINE DIMESYLATE 30 MG/1
30 CAPSULE ORAL DAILY
Qty: 30 CAPSULE | Refills: 0 | Status: SHIPPED | OUTPATIENT
Start: 2025-07-23 | End: 2025-08-22

## 2025-07-23 RX ORDER — LISDEXAMFETAMINE DIMESYLATE 30 MG/1
30 CAPSULE ORAL DAILY
Qty: 30 CAPSULE | Refills: 0 | Status: SHIPPED | OUTPATIENT
Start: 2025-08-23 | End: 2025-09-22

## 2025-07-23 RX ORDER — LISDEXAMFETAMINE DIMESYLATE 30 MG/1
30 CAPSULE ORAL DAILY
Qty: 30 CAPSULE | Refills: 0 | Status: SHIPPED | OUTPATIENT
Start: 2025-09-23 | End: 2025-10-23

## 2025-07-23 NOTE — PATIENT INSTRUCTIONS
Next steps:  1.  Fill your prescribed medication and take as discussed and prescribed: vyvanse 30mg   2.  Continue with meeting with dietitian as directed    3.  Use contraception at all times while on anti-obesity medications.     Please try to work on the following dietary changes:  Daily protein recommendation to start:  grams  Daily carbohydrate:  <100g  Daily calories: 1.200-1.300  1.  Drink water with meals and throughout the day, cut down on soda and/or juice if consumed. Consider flavored water options like Bubbly, Spindrift, Hint and Aaron. Reduce alcohol servings to 4 per week maximum.  2.  Have protein with each meal, examples include: greek yogurt, cottage cheese, hard boiled egg, tofu, chicken, fish, or tuna. Recommended daily protein intake is 100 grams/day.   3.  Work towards reducing/eliminating refined carbohydrates and sugars which includes items such as sweets, as well as rice, pasta, and bread and make sure to choose whole grain options when having them with just 1 serving per meal about the size of your inner palm.  4.  Consume non starchy veggies daily working towards making them a good 50% of your daily food intake. Add them to lunch and dinner consistently.  5.  Start a daily probiotic: VSL#3 is recommended, (order on line at www.vsl3.com). Take 1 capsule daily with water for 30 days, then reduce to 1 every other day (this will reduce the cost). Capsules can be left out of refrigerator for 2 weeks. I recommend using a pill box weekly and keeping the bottle in the fridge.     Please download marianne My Fitness Pal, LoseIt! Or My Net Diary to monitor daily dietary intake and you will be able to see if you are eating the right amount of calories or too much or too little which would hinder weight loss. Additionally this will help to see your daily carbohydrate and protein intake. When you set the marianne up choose 1.5 lbs/week as a goal.  Keeping a paper food journal is an option as well to  remain accountable for your choices- this is the start to mindful eating! A low calorie diet has been consistently shown to support weight loss.     Continue or start exercising to help establish a routine. If not already exercising begin with 1 day/week and progress as able with the goal of working towards 30 minutes 5 days a week at a minimum. A variety or cardio, strength and stretching is important. Review resources below to help support you in building this healthy routine.     Meditation daily can help manage and control stress. Chronic stress can make weight loss difficult.  Exercising is one way to help with stress, but meditation using the CALM Alley or another comparable alternative can be done in your home or place of work with little time commitment. This Alley can also help work on behavior change and improve sleep. Check out the segment under Calm Masterclass and listen to The 4 Pillars of Health. A great way to begin learning about the foundation of lifestyle with practical tips to use in your every day. In addition, we offer counseling services and support for individual connection and care. A referral is necessary so please let me know if this is a service you are interested.     Check out www.yourweightmatters.org blog for continued support and education along your weight loss journey to optimal health!  Patient Resources:     Personal Training/Fitness Classes/Health Coaching     [facility] in [place]: Full fitness center with group fitness and personal training located in [place].  Health Coaching with Gaby Gilbert, Ronnie Tolbert, and Joe Dooley at our [facility]- individual coaching to work on your health goals. Call [phone] and/or email @ [email or web address]. Free 60 minute consult when client of [facility].  AICHA Reyes @ http://www.[email or web address]. A variety of group fitness options plus various yoga classes [phone]  and/or email Mary Ann at [email or web address]  Franchised Fitness Centers with multiple locations: Footway Fitness (www.Kisstixx.Sekai Lab), F45 Training (www.d77ramkwdsu.Sekai Lab), The Efficiency Network (TEN) Body Bootcamp (www.Pascal MetricsbodyboQuad/Graphicsp.Sekai Lab), Holograam (www.Celtic Therapeutics Holdings.Sekai Lab), The Exercise  (www.exercisecoach.com), Club Pilates (www.clubpilaBARRX Medical.Sekai Lab)     Online Fitness  Fitness  on Utube  Fit in 10 DVD series                              www.pmjvl66VBFEmbotics  Chair exercises via Sit and Be Fit (www.sitandbefit.org) and Skylines (www.Narrative.com) or Fran Clemons or Samuel Lyn videos on YouTube.  Hip Hop Fit with Camilo Sandhu at www.hiphopfit.Teladoc     Apps for on the Go Fitness  [place] 7 Minute Workout (orange box with white 7) - free on the go HIIT training alley  Peloton Alley @ www.onepeloton.com     Nutrition Trackers, Meal Preparation, and Other Meal Programs  LoseIT! And Iglu.com Fitness Pal apps and on line for tracking nutrition  NOOM - virtual health coaching  FitFoundation (healthy meals on the go) in [place] @ wwwAgilyxpyrgkyjtsmixm6eEmbotics  Lorin TERAN @ ZilicobistrAutobase and Ypnqwu89 (calorie smart and low carb plans recommended) @ www.lysukk27.com, Metabolic Meals @ www.Iglu.comMetabolicMeals.com - individual prepared meals to go  Gobble, Blue Apron, Home , Every Plate, Sunbasket- on line meal delivery programs for preparation at home  Meal Village in [place] for homemade meals to go @ www.mealvillage.com  Diet Doctor @ www.dietdoctor.com - low carb swaps  ReciMe and Mealime alley (grocery and meal planning)     Stress, Anxiety, Depression, Trauma  CALM meditation alley (www.calm.com)  Headspace  Don't let anxiety run your life. Using the science of emotion regulation and mindfulness to overcome fear and worry by Kushal Pradhan PsyD and Yifan Kilpatrick MA.  The Endeca Podcast (September 27, 2023): 6 Magic Words That Stop Anxiety  What Happened to You?- a look at the impact trauma has on  behavior written by Keily Thomas and Dr. Sunday Pittman  Whole Again by Jw Thompson - discovering your true self after trauma     Mindful Eating/The Hungry Brain  Am I Hungry? Mindful eating virtual  marianne (www.amihungry.com)  The Hungry Brain by Kiara Cody, PhD  Mindless Eating by He Carey  Weight Loss Surgery Will Not Treat Food Addiction by Ines Saleh Ph.D     Metabolic Dysfunction, Hormones and Cravings  Why We Get Sick? By Jacoby Membreno (insulin resistance)  Your Body in Balance: The New Science of Food, Hormones, and Health by Dr. Davion Weathers  The Complete Guide to fasting by Dr. Bejarano  Fast Like a Girl by Dr. Stephanie Manuel  The M Factor (documentary on PBS about Menopause)  Sugar, Salt & Fat by Naomi Mc, Ph.D, R.D.  The Truth About Sugar - documentary on sugar (Free on O[employer]rapeutics, https://StoreFlixu.be/4Q8iyydPR9p)  Presentation on SUGAR called Sugar: The Bitter Truth by Dr. Farhad Kyle ([employer]) https://reKode Education.be/dBnniua6-oM?si=[email or web address]  Reverse Visceral Fat: #1 Way to Increase Your Lifespan & End Inflammation with Dr. Jerald Rivera on Utube @ https://reKode Education.be/nupPRnvUpJY?si=ng8znjXjFCO6IkgR     Nutrition Support  You Are What You Eat - Netfix series on twin study looking at impact of nutrition changes on health  The End of Dieting: How to Live for Life by Dr. Alexandr Mccarty M.D. or listen to The DVS Intelestream Podcast Episode 63: Understanding \"Nutritarian\" Eating w/Dr. Alexandr Mccarty  The Game Changers- Netflix Documentary on plant based nutrition  The Dr. Watt T5 Wellness Plan by Dr. Jose De Jesus Watt MD  The Complete Guide to fasting by Dr. Bejarano  @[email or web address] (Instagram Dietician with support surrounding nutrition and meal prep/planning)     Education, Motivation and Support Resources  Live to 100: Secrets of the Blue Zones - Netflix series on the secrets to communities living over 100 years old  Atomic Habits by Campos Vargas (a book about taking small steps to promote greater behavior  change)   Motivation marianne (black box with white \")- daily supportive messages sent to your phone  Can't Hurt Me by Kushal Barlow (a book exploring the power of discipline in achieving your goals)  Fed Up - documentary about obesity (Free on Utube)  Www.yourweightmatters.org - Obesity Action Coalition sponsored Blog posts  Obesity Action Coalition Resources on topics specific to weight management (www.obesityaction.org)  Fitlosophy Fitspiration - journal to better health (journal book found at [employer] in fitness aisle)  Mrianda Douglas talk titled: The Call to Courage (Netflix)  The Exam Room by the Physician's Committee (Podcast)  Nutrition Facts by Dr. Muniz (Podcast)

## 2025-07-23 NOTE — PROGRESS NOTES
HISTORY OF PRESENT ILLNESS  Chief Complaint   Patient presents with    Weight Check     Down 14 lb 4/11/25 tele     Nesha Freeman is a 44 year old female here for follow up with medical weight loss program for the treatment of overweight, obesity, or morbid obesity.     Down 14 lbs lbs follow-up from 4/2025 via telemedicine  Compliant on vyvanse 30mg   Tolerating well, helping with decreasing appetite and no side effects     Success: fitting into a smaller sizes  Challenging; eating has become easier but finding designated time to work out hasn't been as easy.   Is meal prepping more (teenage son had to cut weight for football and lost #40 lbs, everyone in the house is eating healthy)   Exercise/Activity: 3x/ week, via walking, weights (1-2 days per week)   Nutrition: eating regular meals, +protein, minimal veggies. not tracking reports  Meals out per week on average: 1  Stress is manageable  Sleep: 6 hours/night, waking up feeling rested    Denies chest pain, shortness of breath, dizziness, blurred vision, headache, paresthesia, nausea/vomiting.     Breakfast Lunch Dinner Snacks Fluids   Reviewed              Wt Readings from Last 6 Encounters:   07/23/25 146 lb (66.2 kg)   11/19/24 162 lb (73.5 kg)   10/17/23 140 lb (63.5 kg)   08/01/23 140 lb (63.5 kg)   04/24/23 149 lb (67.6 kg)   01/24/23 165 lb (74.8 kg)          REVIEW OF SYSTEMS  GENERAL: feels well otherwise, denied any fevers chills or night sweats   LUNGS: denies shortness of breath  CARDIOVASCULAR: denies chest pain  GI: denies abdominal pain  MUSCULOSKELETAL: denies back pain, joint pains   PSYCH: denies change in behavior or mood, denies feeling sad or depressed    EXAM  /82   Pulse 118   Resp 18   Ht 5' 5\" (1.651 m)   Wt 146 lb (66.2 kg)   SpO2 99%   BMI 24.30 kg/m²       GENERAL: well developed, well nourished, in no apparent distress, A/O x3  SKIN: no rashes, no suspicious lesions  HEENT: atraumatic, normocephalic, OP-clear,  PERRLA  NECK: supple, no adenopathy  LUNGS: CTA in all fields, breathing non labored  CARDIO: RRR without murmur  GI: +BS, NT/ND, no masses or HSM  EXTREMITIES: no cyanosis, no clubbing, no edema    Lab Results   Component Value Date    GLU 87 05/10/2022    BUN 7 05/10/2022    BUNCREA NOT APPLICABLE 05/10/2022    CREATSERUM 0.66 05/10/2022     06/24/2017    GFRNAA 111 05/10/2022    GFRAA 129 05/10/2022    CA 9.2 05/10/2022    ALKPHO 73 05/10/2022    AST 18 05/10/2022    ALT 20 05/10/2022    BILT 0.4 05/10/2022    TP 7.4 05/10/2022    ALB 4.3 05/10/2022    GLOBULIN 3.1 05/10/2022    AGRATIO 1.4 05/10/2022     05/10/2022    K 4.1 05/10/2022     05/10/2022    CO2 24 05/10/2022     Lab Results   Component Value Date    A1C 5.6 05/10/2022     Lab Results   Component Value Date    CHOLEST 223 (H) 05/10/2022    TRIG 166 (H) 05/10/2022    HDL 53 05/10/2022     (H) 05/10/2022    TCHDLRATIO 4.2 05/10/2022    NONHDLC 170 (H) 05/10/2022     Lab Results   Component Value Date    VITB12 306 05/10/2022     Lab Results   Component Value Date    VITD 15 (L) 05/10/2022       Medications Ordered Prior to Encounter[1]    ASSESSMENT/PLAN    ICD-10-CM    1. Therapeutic drug monitoring  Z51.81       2. Overweight (BMI 25.0-29.9)  E66.3       3. Binge eating  R63.2       4. Vitamin D deficiency  E55.9           PLAN   Initial Weight Data and Goal Weight Loss:  Initial consult: #191 lbs on 5/2022  Weight Calculations  Initial Weight: 191 lbs  Initial Weight Date: 05/09/22  Today's Weight: 146 lbs  5% Goal: 9.55  10% Goal: 19.1  Total Weight Loss: 45 lbs  Total weight loss: Down 14 lbs total, Net loss 45 lbs  Continue with medications: vyvanse 30mg   --advised of side effects and adverse effects of this medication  --Instructed to use contraception at all times while on AOMs  Contradictions: none  Reviewed labs   Repeated body composition: body fat 26.8%, visceral fat 5 and muscle mass 30.8 lbs  Binge eating, stable  on vyvanse  Continue with vitamin d (OTC)  Continue with vitamin b12 complex  Continue with physical exercise and strength training   Wrote out macros and encouraged to track food   Nutrition: Low carb diet, recommended to eat breakfast daily/ regular protein intake  Follow up with dietitian and psychologist as recommended.  Discussed the role of sleep and stress in weight management.  Counseled on comprehensive weight loss plan including attention to nutrition, exercise and behavior/stress management for success. See patient instruction below for more details.  Discussed strategies to overcome barriers to successful weight loss and weight maintenance  FITTE: ACSM recommendations (150-300 minutes/ week in active weight loss)   Weight Loss Consent to treat reviewed and signed.    Total time spent on chart review, pre-charting, obtaining history, counseling, and educating, reviewing labs was 30 minutes.       NOTE TO PATIENT: The 21st Century Cures Act makes clinical notes like these available to patients in the interest of transparency. Clinical notes are medical documents used by physicians and care providers to communicate with each other. These documents include medical language and terminology, abbreviations, and treatment information that may sound technical and at times possibly unfamiliar. In addition, at times, the verbiage may appear blunt or direct. These documents are one tool providers use to communicate relevant information and clinical opinions of the care providers in a way that allows common understanding of the clinical context.     There are no Patient Instructions on file for this visit.    No follow-ups on file.    Patient verbalizes understanding.    MIKE Ventura             [1]   Current Outpatient Medications on File Prior to Visit   Medication Sig Dispense Refill    LISDEXAMFETAMINE 30 MG Oral Cap TAKE ONE CAPSULE BY MOUTH ONE TIME DAILY 30 capsule 0    levonorgestrel 20 MCG/24HR  Intrauterine IUD 20 mcg (1 each total) by Intrauterine route once.       No current facility-administered medications on file prior to visit.      no

## (undated) NOTE — Clinical Note
Is down a total of #40 lbs Sincerely, MIKE Coombs APRN, [facility] Obesity [facility] [address]  [facility] [address] [place], [place], [id number] [facility] Maria Del Carmen Smith. org

## (undated) NOTE — Clinical Note
Dr. Kandi Francisco,  Ms. Neil Day is down a total of #23 lbs! Sincerely, Carol Barcenas, MIKE Helm, [facility] [address] [facility] Weight [facility]  [facility] [address] [facility], [place], [address]   [phone] Juan Antonio Adkins. org

## (undated) NOTE — Clinical Note
Down a total of #26 lbs. Sincerely, MIKE Tim APRN, [facility] [address]  [facility] [address] [facility], [place], [address]  [phone] Malka Goes. org

## (undated) NOTE — ED AVS SNAPSHOT
[facility] Emergency Department  [place]  [address] [address]  Phone:  [phone]  Fax:  [phone]   MRN: [id number]    Department:  [facility] Emergency Department   Date of Visit:  6/24/2017 IF THERE IS ANY CHANGE OR WORSENING OF YOUR CONDITION, CALL YOUR PRIMARY CARE PHYSICIAN AT ONCE OR RETURN IMMEDIATELY TO THE EMERGENCY DEPARTMENT.     If you have been prescribed any medication(s), please fill your prescription right away and begin taking t